# Patient Record
Sex: FEMALE | Race: BLACK OR AFRICAN AMERICAN
[De-identification: names, ages, dates, MRNs, and addresses within clinical notes are randomized per-mention and may not be internally consistent; named-entity substitution may affect disease eponyms.]

---

## 2017-05-05 ENCOUNTER — HOSPITAL ENCOUNTER (EMERGENCY)
Dept: HOSPITAL 17 - NEPE | Age: 72
Discharge: NURSING, REHAB OR LONG-TERM CARE | End: 2017-05-05
Payer: MEDICARE

## 2017-05-05 VITALS
HEART RATE: 95 BPM | DIASTOLIC BLOOD PRESSURE: 98 MMHG | SYSTOLIC BLOOD PRESSURE: 172 MMHG | RESPIRATION RATE: 14 BRPM | OXYGEN SATURATION: 100 %

## 2017-05-05 VITALS
HEART RATE: 107 BPM | DIASTOLIC BLOOD PRESSURE: 95 MMHG | SYSTOLIC BLOOD PRESSURE: 176 MMHG | RESPIRATION RATE: 20 BRPM | OXYGEN SATURATION: 100 %

## 2017-05-05 VITALS
OXYGEN SATURATION: 100 % | HEART RATE: 93 BPM | SYSTOLIC BLOOD PRESSURE: 174 MMHG | RESPIRATION RATE: 15 BRPM | DIASTOLIC BLOOD PRESSURE: 85 MMHG

## 2017-05-05 VITALS
HEART RATE: 112 BPM | SYSTOLIC BLOOD PRESSURE: 191 MMHG | TEMPERATURE: 97.9 F | DIASTOLIC BLOOD PRESSURE: 107 MMHG | RESPIRATION RATE: 20 BRPM | OXYGEN SATURATION: 100 %

## 2017-05-05 DIAGNOSIS — T85.518A: Primary | ICD-10-CM

## 2017-05-05 DIAGNOSIS — Z43.1: ICD-10-CM

## 2017-05-05 DIAGNOSIS — I10: ICD-10-CM

## 2017-05-05 DIAGNOSIS — Z46.59: ICD-10-CM

## 2017-05-05 LAB
ANION GAP SERPL CALC-SCNC: 9 MEQ/L (ref 5–15)
APTT BLD: 24.9 SEC (ref 24.3–30.1)
BASOPHILS # BLD AUTO: 0.1 TH/MM3 (ref 0–0.2)
BASOPHILS NFR BLD: 0.9 % (ref 0–2)
BUN SERPL-MCNC: 16 MG/DL (ref 7–18)
CHLORIDE SERPL-SCNC: 101 MEQ/L (ref 98–107)
EOSINOPHIL # BLD: 0 TH/MM3 (ref 0–0.4)
EOSINOPHIL NFR BLD: 0.5 % (ref 0–4)
ERYTHROCYTE [DISTWIDTH] IN BLOOD BY AUTOMATED COUNT: 17 % (ref 11.6–17.2)
GFR SERPLBLD BASED ON 1.73 SQ M-ARVRAT: 146 ML/MIN (ref 89–?)
HCO3 BLD-SCNC: 30 MEQ/L (ref 21–32)
HCT VFR BLD CALC: 39.2 % (ref 35–46)
HEMO FLAGS: (no result)
INR PPP: 1 RATIO
LYMPHOCYTES # BLD AUTO: 1.4 TH/MM3 (ref 1–4.8)
LYMPHOCYTES NFR BLD AUTO: 22.4 % (ref 9–44)
MCH RBC QN AUTO: 21 PG (ref 27–34)
MCHC RBC AUTO-ENTMCNC: 30.9 % (ref 32–36)
MCV RBC AUTO: 68 FL (ref 80–100)
MONOCYTES NFR BLD: 6.6 % (ref 0–8)
NEUTROPHILS # BLD AUTO: 4.3 TH/MM3 (ref 1.8–7.7)
NEUTROPHILS NFR BLD AUTO: 69.6 % (ref 16–70)
OVALOCYTES BLD QL SMEAR: (no result)
PLAT MORPH BLD: NORMAL
PLATELET # BLD: 287 TH/MM3 (ref 150–450)
PLATELET BLD QL SMEAR: NORMAL
POTASSIUM SERPL-SCNC: 4.1 MEQ/L (ref 3.5–5.1)
PROTHROMBIN TIME: 11.1 SEC (ref 9.8–11.6)
RBC # BLD AUTO: 5.77 MIL/MM3 (ref 4–5.3)
SCAN/DIFF: (no result)
SODIUM SERPL-SCNC: 140 MEQ/L (ref 136–145)
WBC # BLD AUTO: 6.2 TH/MM3 (ref 4–11)

## 2017-05-05 PROCEDURE — 85730 THROMBOPLASTIN TIME PARTIAL: CPT

## 2017-05-05 PROCEDURE — 80048 BASIC METABOLIC PNL TOTAL CA: CPT

## 2017-05-05 PROCEDURE — 96361 HYDRATE IV INFUSION ADD-ON: CPT

## 2017-05-05 PROCEDURE — 85610 PROTHROMBIN TIME: CPT

## 2017-05-05 PROCEDURE — 99283 EMERGENCY DEPT VISIT LOW MDM: CPT

## 2017-05-05 PROCEDURE — 85025 COMPLETE CBC W/AUTO DIFF WBC: CPT

## 2017-05-05 PROCEDURE — 49465 FLUORO EXAM OF G/COLON TUBE: CPT

## 2017-05-05 PROCEDURE — 96374 THER/PROPH/DIAG INJ IV PUSH: CPT

## 2017-05-05 NOTE — RADRPT
EXAM DATE/TIME:  05/05/2017 16:47 

 

[facility] COMPARISON:  

No previous studies available for comparison.

                      

 

INDICATIONS :     

Patient with a clogged gastrostomy tube. 

                      

 

MEDICAL HISTORY :     

Arthritis

GERD

HTN

Dysphagia

Aphsia

UTI

 

SURGICAL HISTORY :     

PEG Tube

Ear Surgery

Hysterectomy

Left sided craniotomy

 

ENCOUNTER:     

Initial

 

ACUITY:     

1 day

 

PAIN SCORE:     

 

 

FLUORO TIME:      

0.2 minutes

 

IMAGE SERIES:      

1

 

CONTRAST:      

5 cc Omnipaque (iohexol) 350

 

 

PROCEDURE :     

1.  Fluoroscopically guided tube injection.

2.  Conscious sedation with continuous EKG and oximetry monitoring.

 

The risks, benefits and alternatives to the procedure were explained and verbal and written consent w
as obtained.  The site was prepped in sterile fashion.  Full sterile technique was used, including ca
p, mask, sterile gloves and gown and a large sterile sheet.  Hand hygiene and 2% chlorhexidine and/or
 betadine/alcohol prep was utilized per protocol for cutaneous antisepsis.

 

With fluoroscopic guidance the previously placed tube was injected demonstrating the tube to be in go
od position and functioning normally.

 

Conscious sedation was performed with the prescribed dosages and duration as above in the presence of
 an independent trained radiology nurse to assist in the monitoring of the patient.  EKG and oximetry
 remained stable throughout the procedure.  The patient tolerated the procedure well and there were n
o complications.  The patient was sent to post anesthesia recovery in stable condition.

 

CONCLUSION:     

Uncomplicated tube injection as above.

 

 

 

 Otoniel Ley MD on May 05, 2017 at 16:38           

Board Certified Radiologist.

 This report was verified electronically.

## 2017-05-05 NOTE — PD
HPI


Chief Complaint:  Medical Device Problem


Time Seen by Provider:  12:14


Travel History


International Travel<30 days:  No


Contact w/Intl Traveler<30days:  No


Traveled to known affect area:  No





History of Present Illness


HPI


The patient is a 72-year-old  [place]ie female who presents emergency 

department via private transport for gastrostomy tube replacement.  The patient 

has a history of intraparenchymal hemorrhage, upon arrival is nonverbal.  

According to the paperwork the patient was unable to have her gastrostomy tube 

flushed and they were unable to flush and/or drawback from the gastrostomy 

tube.  The patient is nonverbal, is unable to provide any insight.  It appears 

the patient has a gastrostomy tube placed, however, there is no visible valve 

for the blowing of the gastrostomy tube.





PFSH


Past Medical History


Arthritis:  Yes


Heart Rhythm Problems:  No


Cancer:  No


Cardiovascular Problems:  Yes


High Cholesterol:  No


Chest Pain:  No


Congestive Heart Failure:  No


Endocrine:  No


Gastrointestinal Disorders:  Yes


GERD:  Yes


Genitourinary:  Yes


Hiatal Hernia:  No


Hypertension:  Yes


Immune Disorder:  No


Kidney Stones:  No


Medical other:  Yes (DYSPHAGIA, APHSIA, BRAIN SURGERY, UTI, LOUIE)


Musculoskeletal:  Yes (CONTRACTED)


Neurologic:  Yes


Psychiatric:  Yes


Reproductive:  No


Respiratory:  No


Renal Failure:  No


Ulcer:  No


Tetanus Vaccination:  Unknown





Past Surgical History


Abdominal Surgery:  Yes (PEG TUBE)


Cardiac Surgery:  No


Ear Surgery:  Yes


Endocrine Surgery:  No


Eye Surgery:  No


Genitourinary Surgery:  No


Gynecologic Surgery:  Yes (HYSTERECTOMY)


Neurologic Surgery:  Yes (LEFT SIDED CRANIOTOMY 11/22/2016)


Oral Surgery:  No


Thoracic Surgery:  No


Other Surgery:  Yes





Social History


Alcohol Use:  No


Tobacco Use:  No (unable to obtain)


Substance Use:  No





Allergies-Medications


(Allergen,Severity, Reaction):  


Coded Allergies:  


     No Known Allergies (Unverified , 5/5/17)


     UNOBTAINABLE (Unverified , 11/22/16)


Reported Meds & Prescriptions





Reported Meds & Active Scripts


Active


Lisinopril 10 Mg Tab 20 Mg PO Q12HR 30 Days


     hold for sbp less 120


Hydrocodone-Acetaminophen 5-325 mg Tab 1 Tab PEG Q4H PRN


Prevacid Solutab ODT (Lansoprazole) 30 Mg Tab 30 Mg G-TUBE DAILY


     Mix with 4 ml water before giving via tube.


Reported


Vesicare (Solifenacin) 5 Mg Tab 5 Mg PO DAILY


Milk of Magnesia Liq (Magnesium Hydroxide) 400 Mg/5 Ml Susp 30 Ml PO Q6H PRN


Donepezil 5 Mg Tab 5 Mg G-TUBE HS


Magnesium Citrate Liq (Magnesium Citrate) 300 Ml Liq 300 Ml PO AS DIRECTED PRN


Multivitamin Women (Multiple Vitamins W/ Minerals) 1 Tab Tab 1 Tab G-TUBE DAILY


Ascorbic Acid 500 Mg Tab 500 Mg G-TUBE DAILY


Amlodipine (Amlodipine Besylate) 5 Mg Tab 5 Mg G-TUBE DAILY








Review of Systems


ROS Limitations:  Clinical Condition


Except as stated in HPI:  all other systems reviewed are Neg





Physical Exam


Narrative


GENERAL: 72-year-old after American female who is nonverbal.


SKIN: Focused skin assessment warm/dry.


HEAD: Atraumatic. Normocephalic. 


EYES: Pupils equal and round.  3 mm bilateral and reactive.  Gaze is mostly to 

the left.


ENT: No nasal bleeding or discharge.  Dry mucous membranes.


NECK: Trachea midline. No JVD. 


CARDIOVASCULAR: Regular, tachycardic with a heart rate of 110.


RESPIRATORY: No accessory muscle use. Clear to auscultation. Breath sounds 

equal bilaterally. 


GASTROINTESTINAL: Abdomen soft, gastrostomy tube in place.  Louie catheter in 

place.


MUSCULOSKELETAL: Contractures of the extremities.  Right upper extremity is in 

a brace.


NEUROLOGICAL: Eyes open, nonverbal, will recheck with her left upper extremity.


PSYCHIATRIC: Unable to assess.





Data


Data


Last Documented VS





Vital Signs








  Date Time  Temp Pulse Resp B/P Pulse Ox O2 Delivery O2 Flow Rate FiO2


 


5/5/17 16:27  107 20 176/95 100   


 


5/5/17 12:18 97.9       








Orders





 Complete Blood Count With Diff (5/5/17 12:27)


Basic Metabolic Panel (Bmp) (5/5/17 12:27)


Act Partial Throm Time (Ptt) (5/5/17 12:27)


Prothrombin Time / Inr (Pt) (5/5/17 12:27)


Sodium Chlorid 0.9% 500 Ml Inj (Ns 500 M (5/5/17 12:30)


Gastrostomy Tube Injection (5/5/17 )


Iohexol 350 Inj (Omnipaque 350 Inj) (5/5/17 16:22)





Labs





 Laboratory Tests








Test 5/5/17





 12:25


 


White Blood Count 6.2 TH/MM3


 


Red Blood Count 5.77 MIL/MM3


 


Hemoglobin 12.1 GM/DL


 


Hematocrit 39.2 %


 


Mean Corpuscular Volume 68.0 FL


 


Mean Corpuscular Hemoglobin 21.0 PG


 


Mean Corpuscular Hemoglobin 30.9 %





Concent 


 


Red Cell Distribution Width 17.0 %


 


Platelet Count 287 TH/MM3


 


Mean Platelet Volume 10.3 FL


 


Neutrophils (%) (Auto) 69.6 %


 


Lymphocytes (%) (Auto) 22.4 %


 


Monocytes (%) (Auto) 6.6 %


 


Eosinophils (%) (Auto) 0.5 %


 


Basophils (%) (Auto) 0.9 %


 


Neutrophils # (Auto) 4.3 TH/MM3


 


Lymphocytes # (Auto) 1.4 TH/MM3


 


Monocytes # (Auto) 0.4 TH/MM3


 


Eosinophils # (Auto) 0.0 TH/MM3


 


Basophils # (Auto) 0.1 TH/MM3


 


CBC Comment AUTO DIFF 


 


Differential Comment AUTO DIFF





 CONFIRMED


 


Platelet Estimate NORMAL 


 


Platelet Morphology Comment NORMAL 


 


Ovalocytes 1+ 


 


Prothrombin Time 11.1 SEC


 


Prothromb Time International 1.0 RATIO





Ratio 


 


Activated Partial 24.9 SEC





Thromboplast Time 


 


Sodium Level 140 MEQ/L


 


Potassium Level 4.1 MEQ/L


 


Chloride Level 101 MEQ/L


 


Carbon Dioxide Level 30.0 MEQ/L


 


Anion Gap 9 MEQ/L


 


Blood Urea Nitrogen 16 MG/DL


 


Creatinine 0.50 MG/DL


 


Estimat Glomerular Filtration 146 ML/MIN





Rate 


 


Random Glucose 112 MG/DL


 


Calcium Level 10.2 MG/DL











[facility]


Medical Decision Making


Medical Screen Exam Complete:  Yes


Emergency Medical Condition:  Yes


Medical Record Reviewed:  Yes


Interpretation(s)





 Laboratory Tests








Test 5/5/17





 12:25


 


White Blood Count 6.2 TH/MM3


 


Red Blood Count 5.77 MIL/MM3


 


Hemoglobin 12.1 GM/DL


 


Hematocrit 39.2 %


 


Mean Corpuscular Volume 68.0 FL


 


Mean Corpuscular Hemoglobin 21.0 PG


 


Mean Corpuscular Hemoglobin 30.9 %





Concent 


 


Red Cell Distribution Width 17.0 %


 


Platelet Count 287 TH/MM3


 


Mean Platelet Volume 10.3 FL


 


Neutrophils (%) (Auto) 69.6 %


 


Lymphocytes (%) (Auto) 22.4 %


 


Monocytes (%) (Auto) 6.6 %


 


Eosinophils (%) (Auto) 0.5 %


 


Basophils (%) (Auto) 0.9 %


 


Neutrophils # (Auto) 4.3 TH/MM3


 


Lymphocytes # (Auto) 1.4 TH/MM3


 


Monocytes # (Auto) 0.4 TH/MM3


 


Eosinophils # (Auto) 0.0 TH/MM3


 


Basophils # (Auto) 0.1 TH/MM3


 


CBC Comment AUTO DIFF 


 


Differential Comment AUTO DIFF





 CONFIRMED


 


Platelet Estimate NORMAL 


 


Platelet Morphology Comment NORMAL 


 


Ovalocytes 1+ 


 


Prothrombin Time 11.1 SEC


 


Prothromb Time International 1.0 RATIO





Ratio 


 


Activated Partial 24.9 SEC





Thromboplast Time 


 


Sodium Level 140 MEQ/L


 


Potassium Level 4.1 MEQ/L


 


Chloride Level 101 MEQ/L


 


Carbon Dioxide Level 30.0 MEQ/L


 


Anion Gap 9 MEQ/L


 


Blood Urea Nitrogen 16 MG/DL


 


Creatinine 0.50 MG/DL


 


Estimat Glomerular Filtration 146 ML/MIN





Rate 


 


Random Glucose 112 MG/DL


 


Calcium Level 10.2 MG/DL








Differential Diagnosis


Differential diagnosis includes gastrostomy tube malfunction, colitis 

gastrostomy tube, GJ tube malfunction, dehydration.


Narrative Course


IV was established, labs are drawn and sent, and the patient was placed on 

cardiac telemetry monitoring and continuous pulse oximetry monitoring.  The 

patient was administered 1 L of IV fluids.  Consult was placed interventional 

radiology for gastrostomy tube replacement.  Labs are unremarkable.  Patient is 

medically clear for IR.  The patient went to interventional radiology, they 

were able to flush and declog the gastrostomy tube, they performed an x-ray 

with contrast which revealed proper placement, the patient is stable for 

discharge home.  The patient's blood pressure was elevated 170/100 with a heart 

rate of 99, she was unable to get her medications this morning secondary to the 

gastrostomy tube malfunction.  Therefore, patient was administered labetalol 20 

mg intravenously.  The patient was discharged back to the nursing home.





Diagnosis





 Primary Impression:  


 Malfunction of gastrostomy tube


Patient Instructions:  General Instructions





***Additional Instructions:


Discharge back to nursing home.  Return if symptoms worsen or progress.


Disposition:  03 DISCHARGE TO SNF (discharge back to nursing home)


Condition:  Stable








Levy Cruz MD May 5, 2017 12:36

## 2017-05-21 ENCOUNTER — HOSPITAL ENCOUNTER (EMERGENCY)
Dept: HOSPITAL 17 - NEPE | Age: 72
LOS: 1 days | Discharge: HOME | End: 2017-05-22
Payer: MEDICARE

## 2017-05-21 VITALS
HEART RATE: 103 BPM | RESPIRATION RATE: 15 BRPM | SYSTOLIC BLOOD PRESSURE: 165 MMHG | OXYGEN SATURATION: 97 % | DIASTOLIC BLOOD PRESSURE: 92 MMHG

## 2017-05-21 VITALS
DIASTOLIC BLOOD PRESSURE: 104 MMHG | SYSTOLIC BLOOD PRESSURE: 192 MMHG | RESPIRATION RATE: 16 BRPM | OXYGEN SATURATION: 98 % | TEMPERATURE: 98.1 F | HEART RATE: 104 BPM

## 2017-05-21 VITALS
HEART RATE: 104 BPM | DIASTOLIC BLOOD PRESSURE: 92 MMHG | OXYGEN SATURATION: 97 % | RESPIRATION RATE: 15 BRPM | SYSTOLIC BLOOD PRESSURE: 186 MMHG

## 2017-05-21 VITALS — HEIGHT: 65 IN | WEIGHT: 132.28 LBS | BODY MASS INDEX: 22.04 KG/M2

## 2017-05-21 DIAGNOSIS — I69.320: ICD-10-CM

## 2017-05-21 DIAGNOSIS — I69.359: ICD-10-CM

## 2017-05-21 DIAGNOSIS — I60.7: Primary | ICD-10-CM

## 2017-05-21 DIAGNOSIS — S00.83XA: ICD-10-CM

## 2017-05-21 DIAGNOSIS — I10: ICD-10-CM

## 2017-05-21 DIAGNOSIS — N28.89: ICD-10-CM

## 2017-05-21 DIAGNOSIS — I69.391: ICD-10-CM

## 2017-05-21 DIAGNOSIS — K21.9: ICD-10-CM

## 2017-05-21 PROCEDURE — 70450 CT HEAD/BRAIN W/O DYE: CPT

## 2017-05-21 PROCEDURE — 70486 CT MAXILLOFACIAL W/O DYE: CPT

## 2017-05-21 PROCEDURE — 72125 CT NECK SPINE W/O DYE: CPT

## 2017-05-21 NOTE — RADRPT
EXAM DATE/TIME:  05/21/2017 19:35 

 

[facility] COMPARISON:     

No previous studies available for comparison.

 

 

INDICATIONS :     

Possible fall. 

                      

 

RADIATION DOSE:     

24.27 CTDIvol (mGy) 

 

 

 

MEDICAL HISTORY :     

Cardiovascular disease. Hypertension. Dementia.Chronic kidney disease.

 

SURGICAL HISTORY :      

Craniotomy. Hysterectomy.

 

ENCOUNTER:      

Initial

 

ACUITY:      

1 day

 

PAIN SCALE:      

0/10

 

LOCATION:        

neck 

 

TECHNIQUE:     

Volumetric scanning of the cervical spine was performed. Multiplanar reconstructions in the sagittal,
 coronal and oblique axial planes were performed.   Using automated exposure control and adjustment o
f the mA and/or kV according to patient size, radiation dose was kept as low as reasonably achievable
 to obtain optimal diagnostic quality images. 

 

FINDINGS:     

There are a few millimeters of anterolisthesis at C4/C5 that appear degenerative. There is a degenera
tive appearing kyphosis centered around C5/C6. No fracture is demonstrated or evidence of acute malal
ignment. Vertebral bodies have normal height.

 

There is multilevel disc space narrowing with uncovertebral and facet osteoarthritis, severe at C5/C6
, moderate at C6/C7 and C7/T1, mild at the other levels. There is a moderate size posterior disc prot
rusion at C3/C4 causing mild spinal stenosis.

 

Juxtavertebral soft tissues are within normal limits.

 

CONCLUSION:     

1. No fracture or acute appearing malalignment of the cervical spine.

2. Degenerative changes as above.

3. Age indeterminate but most likely nonacute posterior disc protrusion at C3/C4.

 

 

 

 Ray Arnold MD on May 21, 2017 at 19:48           

Board Certified Radiologist.

 This report was verified electronically.

## 2017-05-21 NOTE — RADRPT
EXAM DATE/TIME:  05/21/2017 19:35 

 

[facility] COMPARISON:     

No previous studies available for comparison.

 

 

INDICATIONS :     

Possible fall. Hematoma above left eyebrow.

                      

 

RADIATION DOSE:     

21.96 CTDIvol (mGy) 

 

 

MEDICAL HISTORY :     

Dementia. Hypertension. Cardiovascular diseaseChronic kidney disease.

 

SURGICAL HISTORY :      

Craniotomy. Hysterectomy.

 

ENCOUNTER:      

Initial

 

ACUITY:      

1 day

 

PAIN SCORE:      

2/10

 

LOCATION:       

Left facial 

 

TECHNIQUE:     

Volumetric scanning of the facial bones was performed.  Using automated exposure control and adjustme
nt of the mA and/or kV according to patient size, radiation dose was kept as low as reasonably achiev
able to obtain optimal diagnostic quality images. 

 

FINDINGS:     

 

ORBITS:     

The orbital and infraorbital osseous structures are intact.  The retroconal structures have a normal 
configuration.  No radiopaque foreign bodies are seen.

 

NASAL BONE:     

The nasal bone and maxillary spine are intact

 

ZYGOMATIC ARCHES:     

Symmetric without evidence of fracture.

 

SINUSES:     

The maxillary, ethmoid and frontal sinuses are intact.  No air-fluid levels seen.

 

NASAL CAVITY:     

The nasal septum is intact and midline.  The lacrimal ducts are intact.

 

SOFT TISSUES:     

No radiopaque foreign bodies seen.  No soft-tissue swelling is seen.

 

INTRACRANIAL:     

No intracranial air seen.

 

CRIBIFORM PLATE:     

Grossly intact.

 

CONCLUSION:     

Intact face.

 

 

 

 Ray Arnold MD on May 21, 2017 at 19:52           

Board Certified Radiologist.

 This report was verified electronically.

## 2017-05-21 NOTE — RADRPT
EXAM DATE/TIME:  05/21/2017 19:35 

 

[facility] COMPARISON:     

CT BRAIN W/O CONTRAST, November 24, 2016, 4:27.

 

 

INDICATIONS :     

Possible fall. Hematoma above left eyebrow. 

                      

 

RADIATION DOSE:     

56.35 CTDIvol (mGy) 

 

 

 

MEDICAL HISTORY :     

Cardiovascular disease. Hypertension. Dementia.Chronic kidney disease.

 

SURGICAL HISTORY :      

Craniotomy. Hysterectomy.

 

ENCOUNTER:      

Initial

 

ACUITY:      

1 day

 

PAIN SCALE:      

0/10

 

LOCATION:        

cranial 

 

TECHNIQUE:     

Multiple contiguous axial images were obtained of the head.  Using automated exposure control and adj
ustment of the mA and/or kV according to patient size, radiation dose was kept as low as reasonably a
chievable to obtain optimal diagnostic quality images. 

 

FINDINGS:     

6 mm focus of spontaneous density seen of the left high parietal lobe, most likely subarachnoid. No o
ther evidence of intercranial hemorrhage. There is no mass effect or midline shift. No evidence of an
 acute ischemic event.

 

     Patient has had previous left craniotomy. Large area of left parietal, left frontal and left tem
poral encephalomalacia noted. There is chronic appearing thickening of the overlying dura. Craniotomy
 defect has been repaired since the prior CT and the previously seen drain has been removed.

 

     Small left frontal scalp contusion. No acute skull abnormality demonstrated.

 

CONCLUSION:     

There is a potential subcentimeter acute focus of subarachnoid hemorrhage of the left parietal lobe. 
A left frontal scalp hematoma is noted. Other findings are chronic and please see above.

 

 

 

 Ray Arnold MD on May 21, 2017 at 19:43           

Board Certified Radiologist.

 This report was verified electronically.

## 2017-05-21 NOTE — PD
HPI


Chief Complaint:  Medical Clearance


Time Seen by Provider:  19:35


Travel History


International Travel<30 days:  No


Contact w/Intl Traveler<30days:  No


Traveled to known affect area:  No





History of Present Illness


HPI


72-year-old female was sent from [facility] and rehabilitation evaluation 

of a questionable lump over the left eyebrow of unknown origin at the request 

the patient's daughter.  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 

hemiparesis, and difficulty swallowing.  She has an additional history of 

hypertension, seizure disorder, acute renal failure, encephalopathy, GERD, ulcer

, neuromuscular dysfunction bladder, and intracerebral hemorrhage.  Patient's 

baseline is reported to be alert, disoriented, and cannot follow simple 

instructions.  Secondary to patient's history of aphasia secondary to CVA, H&P 

is limited to the documentation sent by [facility] facility.





PFS


Past Medical History


Arthritis:  Yes


Heart Rhythm Problems:  No


Cancer:  No


Cardiovascular Problems:  Yes


High Cholesterol:  No


Chest Pain:  No


Congestive Heart Failure:  No


Endocrine:  No


Gastrointestinal Disorders:  Yes


GERD:  Yes


Genitourinary:  Yes


Hiatal Hernia:  No


Hypertension:  Yes


Immune Disorder:  No


Kidney Stones:  No


Musculoskeletal:  Yes (CONTRACTED)


Neurologic:  Yes


Psychiatric:  Yes


Reproductive:  No


Respiratory:  No


Renal Failure:  No


Ulcer:  No





Past Surgical History


Abdominal Surgery:  Yes (PEG TUBE)


Cardiac Surgery:  No


Ear Surgery:  Yes


Endocrine Surgery:  No


Eye Surgery:  No


Genitourinary Surgery:  No


Gynecologic Surgery:  Yes (HYSTERECTOMY)


Neurologic Surgery:  Yes (LEFT SIDED CRANIOTOMY 11/22/2016, cranioplasty.)


Oral Surgery:  No


Thoracic Surgery:  No


Other Surgery:  Yes





Social History


Alcohol Use:  No


Tobacco Use:  No (unable to obtain)


Substance Use:  No





Allergies-Medications


(Allergen,Severity, Reaction):  


Coded Allergies:  


     No Known Allergies (Unverified , 5/22/17)


Reported Meds & Prescriptions





Reported Meds & Active Scripts


Active


Lisinopril 10 Mg Tab 20 Mg PO Q12HR 30 Days


     hold for sbp less 120


Hydrocodone-Acetaminophen 5-325 mg Tab 1 Tab PEG Q4H PRN


Prevacid Solutab ODT (Lansoprazole) 30 Mg Tab 30 Mg G-TUBE DAILY


     Mix with 4 ml water before giving via tube.


Reported


Vesicare (Solifenacin) 5 Mg Tab 5 Mg PO DAILY


Milk of Magnesia Liq (Magnesium Hydroxide) 400 Mg/5 Ml Susp 30 Ml PO Q6H PRN


Donepezil 5 Mg Tab 5 Mg G-TUBE HS


Magnesium Citrate Liq (Magnesium Citrate) 300 Ml Liq 300 Ml PO AS DIRECTED PRN


Multivitamin Women (Multiple Vitamins W/ Minerals) 1 Tab Tab 1 Tab G-TUBE DAILY


Ascorbic Acid 500 Mg Tab 500 Mg G-TUBE DAILY


Amlodipine (Amlodipine Besylate) 5 Mg Tab 5 Mg G-TUBE DAILY








Review of Systems


ROS Limitations:  Speech Impaired


Except as stated in HPI:  all other systems reviewed are Neg





Physical Exam


Narrative


GENERAL: Well-developed, well nourished, in no acute distress, and non-ill 

appearing.


SKIN: Focused skin assessment warm and dry.  No obvious lesion, hematoma, 

fluctuation or induration, or tenderness noted over area of concern.  Possible 

small amount of soft tissue swelling.


HEAD: Atraumatic. Normocephalic. 


EYES: Pupils equal and round. EOMI. No scleral icterus. No injection or 

drainage. 


ENT: No nasal bleeding or discharge.  Mucous membranes pink and moist.


NECK: Trachea midline. Supple.  No nuclear rigidity.


CARDIOVASCULAR: Regular rate and rhythm.  No murmur appreciated.


RESPIRATORY: No accessory muscle use.  No respiratory distress. Clear to 

auscultation. Breath sounds equal bilaterally. 


GASTROINTESTINAL: Abdomen soft, non-tender, nondistended. Hepatic and splenic 

margins not palpable. No pulsatile mass.


MUSCULOSKELETAL: No obvious deformities. No clubbing.  No cyanosis.  No edema.  

Full range of motion.


NEUROLOGICAL: Awake and alert.





Data


Data


Last Documented VS





Orders





 Ct Brain W/O Iv Contrast(Rout) (5/21/17 )


Ct Cerv Spine W/O Contrast (5/21/17 )


Ct Facial Bones W/O Iv Cont (5/21/17 )


Iv Access Insert/Monitor (5/21/17 20:05)


Ecg Monitoring (5/21/17 20:05)


Oximetry (5/21/17 20:05)


Sodium Chloride 0.9% Flush (Ns Flush) (5/21/17 20:15)


Nicardipine Inj (Cardene Inj) (5/21/17 20:15)


Lisinopril (Prinivil) (5/21/17 20:30)








MDM


Medical Decision Making


Medical Screen Exam Complete:  Yes


Emergency Medical Condition:  Yes


Interpretation(s)


CT of cervical spine reviewed with radiologist shows: 





1. No fracture or acute appearing malalignment of the cervical spine.


2. Degenerative changes as above.


3. Age indeterminate but most likely nonacute posterior disc protrusion at C3/

C4.





CT of facial bones reviewed with radiologist shows: Intact face.





CT of head reviewed with radiologist shows: There is a potential of the 

subcentimeter acute focus of subarachnoid hemorrhage left parietal lobe. A left 

frontal scalp hematoma is noted. Other findings are chronic


Differential Diagnosis


Fracture, contusion, strain, hematoma, cranial hemorrhage, head injury, other


Narrative Course


2015 discussed all findings with patient's daughter's  who is now at the 

bedside.  States that the swelling has decreased significantly.  States she is 

not certain what happened.  States that she had her mom set up she eats went 

out to her car and when she came back noticed a bump on the head.  Reports a 

subsided significantly from initial onset.  Reports the patient has a follow-up 

appointment with Dr. Park tomorrow.





Patient in no obvious distress upon re-evaluation. All pertinent Radiology 

result(s) discussed with patient/family.  Discussed patient with Dr. Lopez 

prior to discharge, who is in agreement with plan of care and disposition..  

Any questions/concerns in reference to patient diagnosis/condition discussed 

and clarified prior to patient's discharge. Reinforced sheer importance of 

close follow up with patient's primary physician or primary care clinic and 

neurosurgeon. Instructed patient to return to ED immediately, if symptoms return

/worsen. Pt's daughter showed understanding of above instructions.  Further 

instructions and recommendations were detailed in discharge paperwork.  Pt left 

without difficulty out of ED at discharge.





Physician Communication


Physician Communication


2010 discussed CT findings with Dr. Hall, neurosurgeon on call, states is 

really nothing to do for this the patient can be followed up in 1-2 days by his 

office for repeat CT as an outpatient.





Diagnosis





 Primary Impression:  


 Subarachnoid hemorrhage


 Additional Impression:  


 Hematoma


Patient Instructions:  General Instructions, Hematoma (ED), Subarachnoid 

Hemorrhage (DC)





***Additional Instructions:


Follow-up with Dr. Park tomorrow as scheduled.  Return to the emergency 

department if symptoms get worse.


Disposition:  01 DISCHARGE HOME


Condition:  Stable








Cedrick Claire May 21, 2017 19:44

## 2017-05-22 VITALS
SYSTOLIC BLOOD PRESSURE: 144 MMHG | DIASTOLIC BLOOD PRESSURE: 84 MMHG | OXYGEN SATURATION: 98 % | RESPIRATION RATE: 16 BRPM | HEART RATE: 98 BPM

## 2017-05-22 VITALS
HEART RATE: 82 BPM | DIASTOLIC BLOOD PRESSURE: 80 MMHG | TEMPERATURE: 99.3 F | OXYGEN SATURATION: 98 % | RESPIRATION RATE: 20 BRPM | SYSTOLIC BLOOD PRESSURE: 142 MMHG

## 2017-05-22 VITALS — DIASTOLIC BLOOD PRESSURE: 78 MMHG | SYSTOLIC BLOOD PRESSURE: 142 MMHG

## 2017-08-24 ENCOUNTER — HOSPITAL ENCOUNTER (INPATIENT)
Dept: HOSPITAL 17 - NEPE | Age: 72
LOS: 2 days | Discharge: HOSPICE | DRG: 64 | End: 2017-08-26
Attending: FAMILY MEDICINE | Admitting: FAMILY MEDICINE
Payer: MEDICARE

## 2017-08-24 VITALS
RESPIRATION RATE: 14 BRPM | TEMPERATURE: 98.9 F | HEART RATE: 99 BPM | SYSTOLIC BLOOD PRESSURE: 163 MMHG | OXYGEN SATURATION: 98 % | DIASTOLIC BLOOD PRESSURE: 85 MMHG

## 2017-08-24 VITALS
SYSTOLIC BLOOD PRESSURE: 227 MMHG | RESPIRATION RATE: 16 BRPM | DIASTOLIC BLOOD PRESSURE: 108 MMHG | OXYGEN SATURATION: 98 % | HEART RATE: 133 BPM

## 2017-08-24 VITALS
RESPIRATION RATE: 15 BRPM | OXYGEN SATURATION: 100 % | SYSTOLIC BLOOD PRESSURE: 199 MMHG | HEART RATE: 124 BPM | DIASTOLIC BLOOD PRESSURE: 95 MMHG

## 2017-08-24 VITALS
DIASTOLIC BLOOD PRESSURE: 69 MMHG | HEART RATE: 93 BPM | SYSTOLIC BLOOD PRESSURE: 148 MMHG | OXYGEN SATURATION: 100 % | RESPIRATION RATE: 16 BRPM

## 2017-08-24 VITALS
OXYGEN SATURATION: 99 % | SYSTOLIC BLOOD PRESSURE: 156 MMHG | HEART RATE: 87 BPM | DIASTOLIC BLOOD PRESSURE: 80 MMHG | RESPIRATION RATE: 14 BRPM | TEMPERATURE: 97.8 F

## 2017-08-24 VITALS
OXYGEN SATURATION: 100 % | RESPIRATION RATE: 16 BRPM | SYSTOLIC BLOOD PRESSURE: 164 MMHG | HEART RATE: 112 BPM | DIASTOLIC BLOOD PRESSURE: 89 MMHG

## 2017-08-24 VITALS — WEIGHT: 129.63 LBS | BODY MASS INDEX: 19.2 KG/M2 | HEIGHT: 69 IN

## 2017-08-24 VITALS — HEART RATE: 80 BPM

## 2017-08-24 VITALS — OXYGEN SATURATION: 99 %

## 2017-08-24 VITALS — HEART RATE: 78 BPM

## 2017-08-24 VITALS — SYSTOLIC BLOOD PRESSURE: 146 MMHG | DIASTOLIC BLOOD PRESSURE: 78 MMHG

## 2017-08-24 VITALS — HEART RATE: 118 BPM

## 2017-08-24 DIAGNOSIS — K21.9: ICD-10-CM

## 2017-08-24 DIAGNOSIS — N39.0: ICD-10-CM

## 2017-08-24 DIAGNOSIS — G93.40: ICD-10-CM

## 2017-08-24 DIAGNOSIS — Z51.5: ICD-10-CM

## 2017-08-24 DIAGNOSIS — Z90.710: ICD-10-CM

## 2017-08-24 DIAGNOSIS — M19.90: ICD-10-CM

## 2017-08-24 DIAGNOSIS — Z93.1: ICD-10-CM

## 2017-08-24 DIAGNOSIS — I69.320: ICD-10-CM

## 2017-08-24 DIAGNOSIS — Z74.01: ICD-10-CM

## 2017-08-24 DIAGNOSIS — M21.371: ICD-10-CM

## 2017-08-24 DIAGNOSIS — I61.9: Primary | ICD-10-CM

## 2017-08-24 DIAGNOSIS — F03.90: ICD-10-CM

## 2017-08-24 DIAGNOSIS — G40.409: ICD-10-CM

## 2017-08-24 DIAGNOSIS — M21.372: ICD-10-CM

## 2017-08-24 DIAGNOSIS — R29.810: ICD-10-CM

## 2017-08-24 DIAGNOSIS — Z66: ICD-10-CM

## 2017-08-24 DIAGNOSIS — Z79.899: ICD-10-CM

## 2017-08-24 DIAGNOSIS — I69.391: ICD-10-CM

## 2017-08-24 DIAGNOSIS — I10: ICD-10-CM

## 2017-08-24 LAB
ALP SERPL-CCNC: 118 U/L (ref 45–117)
ALT SERPL-CCNC: 20 U/L (ref 10–53)
ANION GAP SERPL CALC-SCNC: 7 MEQ/L (ref 5–15)
AST SERPL-CCNC: 17 U/L (ref 15–37)
BACTERIA #/AREA URNS HPF: (no result) /HPF
BASOPHILS # BLD AUTO: 0.1 TH/MM3 (ref 0–0.2)
BASOPHILS NFR BLD: 1.2 % (ref 0–2)
BILIRUB SERPL-MCNC: 0.4 MG/DL (ref 0.2–1)
BUN SERPL-MCNC: 15 MG/DL (ref 7–18)
CHLORIDE SERPL-SCNC: 104 MEQ/L (ref 98–107)
COLOR UR: YELLOW
COMMENT (UR): (no result)
CULTURE IF INDICATED: (no result)
EOSINOPHIL # BLD: 0.1 TH/MM3 (ref 0–0.4)
EOSINOPHIL NFR BLD: 1.1 % (ref 0–4)
ERYTHROCYTE [DISTWIDTH] IN BLOOD BY AUTOMATED COUNT: 17.9 % (ref 11.6–17.2)
GFR SERPLBLD BASED ON 1.73 SQ M-ARVRAT: 140 ML/MIN (ref 89–?)
GLUCOSE UR STRIP-MCNC: (no result) MG/DL
HCO3 BLD-SCNC: 29.3 MEQ/L (ref 21–32)
HCT VFR BLD CALC: 38.4 % (ref 35–46)
HEMO FLAGS: (no result)
HGB UR QL STRIP: (no result)
KETONES UR STRIP-MCNC: (no result) MG/DL
LYMPHOCYTES # BLD AUTO: 2.1 TH/MM3 (ref 1–4.8)
LYMPHOCYTES NFR BLD AUTO: 33.7 % (ref 9–44)
MCH RBC QN AUTO: 21.4 PG (ref 27–34)
MCHC RBC AUTO-ENTMCNC: 30.8 % (ref 32–36)
MCV RBC AUTO: 69.5 FL (ref 80–100)
MONOCYTES NFR BLD: 7.7 % (ref 0–8)
NEUTROPHILS # BLD AUTO: 3.6 TH/MM3 (ref 1.8–7.7)
NEUTROPHILS NFR BLD AUTO: 56.3 % (ref 16–70)
NITRITE UR QL STRIP: (no result)
PLATELET # BLD: 274 TH/MM3 (ref 150–450)
POTASSIUM SERPL-SCNC: 3.8 MEQ/L (ref 3.5–5.1)
RBC # BLD AUTO: 5.52 MIL/MM3 (ref 4–5.3)
SODIUM SERPL-SCNC: 140 MEQ/L (ref 136–145)
SP GR UR STRIP: 1.01 (ref 1–1.03)
SQUAMOUS #/AREA URNS HPF: 3 /HPF (ref 0–5)
WBC # BLD AUTO: 6.3 TH/MM3 (ref 4–11)

## 2017-08-24 PROCEDURE — 87186 SC STD MICRODIL/AGAR DIL: CPT

## 2017-08-24 PROCEDURE — 96372 THER/PROPH/DIAG INJ SC/IM: CPT

## 2017-08-24 PROCEDURE — 85610 PROTHROMBIN TIME: CPT

## 2017-08-24 PROCEDURE — 80053 COMPREHEN METABOLIC PANEL: CPT

## 2017-08-24 PROCEDURE — 70450 CT HEAD/BRAIN W/O DYE: CPT

## 2017-08-24 PROCEDURE — 87641 MR-STAPH DNA AMP PROBE: CPT

## 2017-08-24 PROCEDURE — 81001 URINALYSIS AUTO W/SCOPE: CPT

## 2017-08-24 PROCEDURE — 87086 URINE CULTURE/COLONY COUNT: CPT

## 2017-08-24 PROCEDURE — 85025 COMPLETE CBC W/AUTO DIFF WBC: CPT

## 2017-08-24 PROCEDURE — 87077 CULTURE AEROBIC IDENTIFY: CPT

## 2017-08-24 PROCEDURE — 93005 ELECTROCARDIOGRAM TRACING: CPT

## 2017-08-24 PROCEDURE — 96374 THER/PROPH/DIAG INJ IV PUSH: CPT

## 2017-08-24 RX ADMIN — DONEPEZIL HYDROCHLORIDE SCH MG: 5 TABLET, FILM COATED ORAL at 20:45

## 2017-08-24 RX ADMIN — Medication SCH ML: at 20:46

## 2017-08-24 RX ADMIN — LISINOPRIL SCH MG: 20 TABLET ORAL at 20:46

## 2017-08-24 NOTE — PD.CONS
HPI


Service


Critical Care Medicine


Consult Requested By





Primary Care Physician


Nestor Mccloud DO


History of Present Illness


72 year old female with a history of stroke who is at baseline aphasic, 

bedridden and is fed by a G-tube who lives in a nursing home presents having 

had a CT scan done to follow-up on possible hydrocephalus.  Incidentally it was 

found that she had a small 1 cm lesion in the right parietal lobe which is 

hyperdense concerning for an intracranial hemorrhage.  Per chart documentation 

the patient would not want to be resuscitated if she were to die naturally in 

the hospital.  The family would not want aggressive measures for her and there 

would like her to be kept comfortable.  They were agreeable to a DNR/DNI order.





Review of Systems


ROS


Unobtainable patient is nonverbal





Past Family Social History


Allergies:  


Coded Allergies:  


     No Known Allergies (Unverified , 5/22/17)


Past Medical History


Arthritis


Cerebrovascular Accident


Dementia


GERD


Hypertension


Past Surgical History


PEG tube placement


Hysterectomy


Ear surgery


Reported Medications





Reported Meds & Active Scripts


Active


Lisinopril 10 Mg Tab 20 Mg PO Q12HR 30 Days


     hold for sbp less 120


Hydrocodone-Acetaminophen 5-325 mg Tab 1 Tab PEG Q4H PRN


Reported


Zinc Sulfate 220 Mg Tab 220 Mg PEG DAILY


Zantac (Ranitidine HCl) 150 Mg Tab 150 Mg PEG DAILY


Enema Disposable (Sodium Phosphates) 1 Tasha Tasha 1 Applic RECTAL IN THE AM PRN


Dulcolax Supp (Bisacodyl) 10 Mg Supp 10 Mg RECTAL IN THE AM PRN


Tylenol (Acetaminophen) 325 Mg Tab 650 Mg PEG Q4H PRN


Milk of Magnesia Liq (Magnesium Hydroxide) 400 Mg/5 Ml Susp 30 Ml PEG HS PRN


Donepezil 5 Mg Tab 5 Mg PEG HS


Magnesium Citrate Liq (Magnesium Citrate) 300 Ml Liq 300 Ml PEG IN THE AM PRN


Ascorbic Acid 500 Mg Tab 500 Mg PEG DAILY


Amlodipine (Amlodipine Besylate) 5 Mg Tab 5 Mg PEG DAILY


Active Ordered Medications





Current Medications








 Medications


  (Trade)  Dose


 Ordered  Sig/Taina


 Route


 PRN Reason  Start Time


 Stop Time Status Last Admin


Dose Admin


 


 Ondansetron HCl


  (Zofran Inj)  4 mg  Q6H  PRN


 IV


 NAUSEA OR VOMITING  8/24/17 17:30


     


 


 


 Acetaminophen


  (Tylenol)  650 mg  Q4H  PRN


 PO


 Temp>101F, Headache  8/24/17 17:30


     


 


 


 Acetaminophen


  (Tylenol Supp)  650 mg  Q4H  PRN


 RECTAL


 Temp>101F, Headache  8/24/17 17:30


     


 


 


 Sodium Chloride


  (NS Flush)  2 ml  BID


 IV FLUSH


   8/24/17 21:00


    8/24/17 20:46


 


 


 Sodium Chloride


  (NS Flush)  2 ml  UNSCH  PRN


 IVF


 FLUSH AFTER USING IV ACCESS  8/24/17 17:30


     


 


 


 Amlodipine


 Besylate


  (Norvasc)  5 mg  DAILY


 G-TUBE


   8/25/17 09:00


     


 


 


 Ascorbic Acid


  (Vitamin C)  500 mg  DAILY


 G-TUBE


   8/25/17 09:00


     


 


 


 Donepezil HCl


  (Aricept)  5 mg  HS


 G-TUBE


   8/24/17 21:00


    8/24/17 20:45


 


 


 Acetaminophen/


 Hydrocodone Bitart


  (Norco  5-325 Mg)  1 tab  Q4H  PRN


 PEG


 PAIN SCALE 1 TO 10  8/24/17 17:45


     


 


 


 Lansoprazole


  (Prevacid Odt)  30 mg  DAILY


 G-TUBE


   8/25/17 09:00


     


 


 


 Lisinopril


  (Prinivil)  20 mg  Q12HR


 PO


   8/24/17 21:00


    8/24/17 20:46


 


 


 Magnesium


 Hydroxide


  (Milk Of


 Magnesia Liq)  30 ml  Q6H  PRN


 PO


 CONSTIPATION  8/24/17 17:45


     


 


 


 Tolterodine


 Tartrate


  (Detrol La)  2 mg  DAILY


 PO


   8/24/17 09:00


     


 


 


 Nicardipine HCl


 25 mg/Sodium


 Chloride  260 ml @ 


 52 mls/hr  Q5H PRN


 IV


 Blood pressure management  8/24/17 21:28


     


 








Family History


No family history of early coronary artery disease or malignancy


Social History


Negative for tobacco alcohol or illicit drug abuse





Physical Exam


Vital Signs





Vital Signs








  Date Time  Temp Pulse Resp B/P (MAP) Pulse Ox O2 Delivery O2 Flow Rate FiO2


 


8/24/17 18:01  93 16 148/69 (95) 100 Room Air  


 


8/24/17 17:58        21


 


8/24/17 17:46  112 16 164/89 (114) 100 Room Air  


 


8/24/17 17:13  118      


 


8/24/17 17:11  124 15 199/95 (129) 100 Room Air  


 


8/24/17 16:00  133 16 227/108 (147) 98 Room Air  


 


8/24/17 14:51  95   100   


 


8/24/17 14:48 97.8 87 14 156/80 (105) 99   








Physical Exam


GENERAL: Elderly debilitated woman, nonverbal lethargic


SKIN: Warm and dry.


HEAD: Normocephalic.


EYES: No scleral icterus. No injection or drainage. 


NECK: Supple, trachea midline. No JVD or lymphadenopathy.


CARDIOVASCULAR: Regular rate and rhythm without murmurs, gallops, or rubs. 


RESPIRATORY: Breath sounds equal bilaterally. No accessory muscle use.


GASTROINTESTINAL: Abdomen soft, non-tender, nondistended. 


MUSCULOSKELETAL: No cyanosis, or edema. 


BACK: Nontender without obvious deformity. 


NEURO EXAM:


GCS: M3 V1 E3


Mental Status: The patient is lethargic and nonverbal


Cranial Nerves: Pupils are round, reactive to light


Reflexes: Biceps, patellar, and Achilles are 2/4 bilaterally. No clonus.


Laboratory





Laboratory Tests








Test


  8/24/17


15:00


 


White Blood Count 6.3 


 


Red Blood Count 5.52 


 


Hemoglobin 11.8 


 


Hematocrit 38.4 


 


Mean Corpuscular Volume 69.5 


 


Mean Corpuscular Hemoglobin 21.4 


 


Mean Corpuscular Hemoglobin


Concent 30.8 


 


 


Red Cell Distribution Width 17.9 


 


Platelet Count 274 


 


Mean Platelet Volume 10.1 


 


Neutrophils (%) (Auto) 56.3 


 


Lymphocytes (%) (Auto) 33.7 


 


Monocytes (%) (Auto) 7.7 


 


Eosinophils (%) (Auto) 1.1 


 


Basophils (%) (Auto) 1.2 


 


Neutrophils # (Auto) 3.6 


 


Lymphocytes # (Auto) 2.1 


 


Monocytes # (Auto) 0.5 


 


Eosinophils # (Auto) 0.1 


 


Basophils # (Auto) 0.1 


 


CBC Comment DIFF FINAL 


 


Differential Comment  


 


Urine Color YELLOW 


 


Urine Turbidity CLOUDY 


 


Urine pH 7.5 


 


Urine Specific Gravity 1.010 


 


Urine Protein TRACE 


 


Urine Glucose (UA) NEG 


 


Urine Ketones NEG 


 


Urine Occult Blood TRACE 


 


Urine Nitrite POS 


 


Urine Bilirubin NEG 


 


Urine Urobilinogen LESS THAN 2.0 


 


Urine Leukocyte Esterase LARGE 


 


Urine RBC 6 


 


Urine WBC 91 


 


Urine Squamous Epithelial


Cells 3 


 


 


Urine Amorphous Sediment RARE 


 


Urine Bacteria MANY 


 


Microscopic Urinalysis Comment


  CULTURE


INDICATED


 


Blood Urea Nitrogen 15 


 


Creatinine 0.52 


 


Random Glucose 95 


 


Total Protein 8.3 


 


Albumin 3.4 


 


Calcium Level 10.2 


 


Alkaline Phosphatase 118 


 


Aspartate Amino Transf


(AST/SGOT) 17 


 


 


Alanine Aminotransferase


(ALT/SGPT) 20 


 


 


Total Bilirubin 0.4 


 


Sodium Level 140 


 


Potassium Level 3.8 


 


Chloride Level 104 


 


Carbon Dioxide Level 29.3 


 


Anion Gap 7 


 


Estimat Glomerular Filtration


Rate 140 


 














 Date/Time


Source Procedure


Growth Status


 


 


 8/24/17 15:00


Urine Clean Catch Urine Culture


Pending Received








Result Diagram:  


8/24/17 1500                                                                   

             8/24/17 1500








Assessment and Plan


Assessment and Plan


New CNS lesion 


- neuro checks in a serial fashion. 


- nonoperative supportive care


- Keppra for prophylaxis of seizures


- Management per neurosurgery





Hypertension


- Cardene drip


- SBP goal less than 150





History of Cerebrovascular Accident


- PT and OT evaluation





Dementia


- Supportive care





GERD


- Protonix





DVT GI prophylaxis


- Teds SCDs


- No pharmacological DVT prophylaxis due to suspected ICH


- Protonix





Critical Care:


The total critical care time was 35 minutes. Time to perform other separately 

billable procedures was not included in the critical care time.











Anthony Garcia MD Aug 24, 2017 18:25

## 2017-08-24 NOTE — PD.CONS
__________________________________________________





HPI


Service


neurosurgery


Consult Requested By


Dr Epps


Reason for Consult


Brain lesion


Primary Care Physician


Nestor Mccloud, DO


History of Present Illness


This is a 72 year old female who was brought to [place] emergency department 

with altered mental status. She has history of prior ICH and a prior craniotomy 

in 2016. She had a hemorrhagic stroke and she is at baseline aphasic. In 

addition nshe is bedridden and is fed by a G-tube. She is a resident  [facility], followed by Dr. Winston. 





She was taken  to the emergency department having had a CT scan done for 

increased generalized weakness and lethargy. She was seen at [facility] ER and a CT 

scan showed a 1 cm lesion in the right parietal lobe which is hyperdense 

concerning for an intracranial hemorrhage. Upon presentation to [facility] ER she 

was hemodynamically stable. She had a generalized tonic clonic seizure. No 

tongue bitting. No incontinence of stool. No incontinence of urine. she was 

given anticonvulsants





 ROS - General 


Review of Systems


Constitutional:  COMPLAINS OF: Fever, Dizziness


Patient unable to give ROS





 PFSH 


Past Family Social History


Allergies:  


Coded Allergies:  


     No Known Allergies (Unverified , 5/22/17)


Past Medical History


Arthritis


CVA


Dementia


GERD


HTN


Past Surgical History


Peg tube placement


Hysterectomy


ear surgery


Active Ordered Medications





Current Medications








 Medications


  (Trade)  Dose


 Ordered  Sig/Taina


 Route  Start Time


 Stop Time Status Last Admin


 


  (Zofran Inj)  4 mg  Q6H  PRN


 IV  8/24/17 17:30


     


 


 


  (Tylenol)  650 mg  Q4H  PRN


 PO  8/24/17 17:30


     


 


 


  (Tylenol Supp)  650 mg  Q4H  PRN


 RECTAL  8/24/17 17:30


     


 


 


  (NS Flush)  2 ml  BID


 IV FLUSH  8/24/17 21:00


    8/25/17 09:00


 


 


  (NS Flush)  2 ml  UNSCH  PRN


 IVF  8/24/17 17:30


     


 


 


  (Norvasc)  5 mg  DAILY


 G-TUBE  8/25/17 09:00


    8/25/17 08:59


 


 


  (Vitamin C)  500 mg  DAILY


 G-TUBE  8/25/17 09:00


    8/25/17 09:00


 


 


  (Aricept)  5 mg  HS


 G-TUBE  8/24/17 21:00


    8/24/17 20:45


 


 


  (Norco  5-325 Mg)  1 tab  Q4H  PRN


 PEG  8/24/17 17:45


     


 


 


  (Prevacid Odt)  30 mg  DAILY


 G-TUBE  8/25/17 09:00


    8/25/17 08:59


 


 


  (Prinivil)  20 mg  Q12HR


 PO  8/24/17 21:00


    8/25/17 09:00


 


 


  (Milk Of


 Magnesia Liq)  30 ml  Q6H  PRN


 PO  8/24/17 17:45


     


 


 


  (Detrol La)  2 mg  DAILY


 PO  8/24/17 09:00


    8/25/17 09:00


 


 


 Nicardipine HCl


 25 mg/Sodium


 Chloride  260 ml @ 


 52 mls/hr  Q5H PRN


 IV  8/24/17 21:28


     


 








Family History


Unable to give history


Social History


Lives at [facility]





 Physical Exam 


Physical Exam


Vital Signs





Vital Signs








  Date Time  Temp Pulse Resp B/P (MAP) Pulse Ox O2 Delivery O2 Flow Rate FiO2


 


8/25/17 14:00  93      


 


8/25/17 12:00  74      


 


8/25/17 10:00  74      


 


8/25/17 08:00  74      


 


8/25/17 07:00     100 Room Air  


 


8/25/17 07:00  76      


 


8/25/17 06:00  73      


 


8/25/17 04:00  78      


 


8/25/17 04:00 98.9 78 15 107/60 (76) 99   


 


8/25/17 02:00  77      


 


8/25/17 00:00 98.7 83 15 129/68 (88) 99   


 


8/25/17 00:00  74      


 


8/24/17 23:00  78      


 


8/24/17 23:00  80  123/72    


 


8/24/17 22:00  80      


 


8/24/17 21:16  89  161/76    


 


8/24/17 20:45  90  177/83    


 


8/24/17 20:00     99 Room Air  


 


8/24/17 20:00 98.9 99 14 163/85 (111) 98   


 


8/24/17 20:00  99      


 


8/24/17 19:20     99   


 


8/24/17 18:53  96 16 146/78 (100) 99   


 


8/24/17 18:01  93 16 148/69 (95) 100 Room Air  


 


8/24/17 17:58        21


 


8/24/17 17:46  112 16 164/89 (114) 100 Room Air  


 


8/24/17 17:13  118      


 


8/24/17 17:11  124 15 199/95 (129) 100 Room Air  


 


8/24/17 16:00  133 16 227/108 (147) 98 Room Air  


 


8/24/17 14:51  95   100   


 


8/24/17 14:48 97.8 87 14 156/80 (105) 99   








Physical Exam


CONSTITUTIONAL/GENERAL: This is an frail, elderly female in bed in no acute 

distress. 


TUBES/LINES/DRAINS: PIV's, Freed catheter.


SKIN: No jaundice, rashes, or lesions.  No wounds seen anteriorly. Skin 

temperature appropriate. Not diaphoretic. Per DTR excoriation on buttocks


HEAD: Atraumatic. Normocephalic.  


EYES:  No scleral icterus. No injection or drainage.  


ENT: Unable to evaluate hearing.  Nose without bleeding or purulent drainage.  

Moist oral mucosa.


NECK: Trachea midline. Supple. 


CARDIOVASCULAR: Regular rate and rhythm without murmurs, gallops, or rubs. No 

JVD. Peripheral pulses symmetric.


RESPIRATORY/CHEST: Symmetric, unlabored respirations. Clear, diminished to 

auscultation. Breath sounds equal bilaterally. No wheezes, rales, or rhonchi.  


GASTROINTESTINAL: Abdomen soft, non-tender, nondistended.  Bowel sounds 

present.  PEG tube in place.


GENITOURINARY: Without palpable bladder distension. Freed catheter in place.


MUSCULOSKELETAL: Muscular atrophy in all 4 extremities.  Contracted 

extremities.  Bilateral foot drop.  No mottling or clubbing.


NEUROLOGICAL: Awake and alert.  Eyes open, not tracking.  Not following 

commands.  Nonverbal.  Left-sided facial droop noted.


Laboratory





Past Family Social History


Allergies:  


Coded Allergies:  


     No Known Allergies (Unverified , 5/22/17)





Physical Exam


Vital Signs





Vital Signs








  Date Time  Temp Pulse Resp B/P (MAP) Pulse Ox O2 Delivery O2 Flow Rate FiO2


 


8/24/17 14:51  95   100   


 


8/24/17 14:48 97.8 87 14 156/80 (105) 99   








Laboratory





Laboratory Tests








Test


  8/24/17


15:00


 


White Blood Count 6.3 


 


Red Blood Count 5.52 


 


Hemoglobin 11.8 


 


Hematocrit 38.4 


 


Mean Corpuscular Volume 69.5 


 


Mean Corpuscular Hemoglobin 21.4 


 


Mean Corpuscular Hemoglobin


Concent 30.8 


 


 


Red Cell Distribution Width 17.9 


 


Platelet Count 274 


 


Mean Platelet Volume 10.1 


 


Neutrophils (%) (Auto) 56.3 


 


Lymphocytes (%) (Auto) 33.7 


 


Monocytes (%) (Auto) 7.7 


 


Eosinophils (%) (Auto) 1.1 


 


Basophils (%) (Auto) 1.2 


 


Neutrophils # (Auto) 3.6 


 


Lymphocytes # (Auto) 2.1 


 


Monocytes # (Auto) 0.5 


 


Eosinophils # (Auto) 0.1 


 


Basophils # (Auto) 0.1 


 


CBC Comment DIFF FINAL 


 


Differential Comment  








Result Diagram:  


8/24/17 1500








Attending Statement


Neuro.  neuro checks in a serial fashion. I recommend nonoperative supportive 

care.  I have discussed the case with the emergency room physician





Antihypertensives to keep her blood pressure controlled





Keppra for prophylaxis of seizures. 





Pulmonary. aggressive pulmonary toilette, nasotracheal suction, and breathing 

treatments with nebulizers.





PT and OT evaluation





Nutrition. Oral diet





Renal. monitor closely urine output, BUN and creatinine





Endocrine. Monitor serial Acu checks and SSI as needed in detail





ID monitor for signs of infection





Protonix for stress ulcer prophylaxis





Wilber steven and SCD's for DVT prophylaxis











Marc Park MD Aug 24, 2017 15:43

## 2017-08-24 NOTE — PD
HPI


Chief Complaint:  Abnormal Results


Time Seen by Provider:  14:33


Travel History


International Travel<30 days:  No


Contact w/Intl Traveler<30days:  No





History of Present Illness


HPI


This is a 72 year old female who presents to the emergency department who has a 

history of stroke who is at baseline aphasic, bedridden and is fed by a G-tube 

who lives in a nursing home who presents to the emergency department having had 

a CT scan done to follow-up on possible hydrocephalus when it was found that 

she had a small 1 cm lesion in the right parietal lobe which is hyperdense 

concerning for an intracranial hemorrhage.  Her daughter reports that she 

thinks she's been more tired than normal.





Her daughter from [place] as her dedicated power of .  I spoke to 

both that daughter and the daughter who is at her bedside.  They both concurred 

that the patient would not want to be resuscitated if she were to die naturally 

in the hospital.  They would not want aggressive measures for her and there 

would like her to be kept comfortable.  They were agreeable to a DNR/DNI order.





PFSH


Past Medical History


Arthritis:  Yes


Heart Rhythm Problems:  No


Cancer:  No


Cardiovascular Problems:  Yes


High Cholesterol:  No


Chest Pain:  No


Congestive Heart Failure:  No


Cerebrovascular Accident:  Yes


Dementia:  Yes


Endocrine:  No


Gastrointestinal Disorders:  Yes


GERD:  Yes


Genitourinary:  Yes


Hiatal Hernia:  No


Hypertension:  Yes


Immune Disorder:  No


Kidney Stones:  No


Musculoskeletal:  Yes (CONTRACTED)


Neurologic:  Yes


Psychiatric:  Yes


Reproductive:  No


Respiratory:  No


Renal Failure:  No


Ulcer:  No


Tetanus Vaccination:  Unknown


Influenza Vaccination:  No


Pregnant?:  Not Pregnant





Past Surgical History


Abdominal Surgery:  Yes (PEG TUBE)


Cardiac Surgery:  No


Ear Surgery:  Yes


Endocrine Surgery:  No


Eye Surgery:  No


Genitourinary Surgery:  No


Gynecologic Surgery:  Yes (HYSTERECTOMY)


Hysterectomy:  Yes


Neurologic Surgery:  Yes


Oral Surgery:  No


Thoracic Surgery:  No


Other Surgery:  Yes





Social History


Alcohol Use:  No


Tobacco Use:  No


Substance Use:  No





Allergies-Medications


(Allergen,Severity, Reaction):  


Coded Allergies:  


     No Known Allergies (Unverified , 5/22/17)


Reported Meds & Prescriptions





Reported Meds & Active Scripts


Active


Lisinopril 10 Mg Tab 20 Mg PO Q12HR 30 Days


     hold for sbp less 120


Hydrocodone-Acetaminophen 5-325 mg Tab 1 Tab PEG Q4H PRN


Reported


Zinc Sulfate 220 Mg Tab 220 Mg PEG DAILY


Zantac (Ranitidine HCl) 150 Mg Tab 150 Mg PEG DAILY


Enema Disposable (Sodium Phosphates) 1 Tasha Tasha 1 Applic RECTAL IN THE AM PRN


Dulcolax Supp (Bisacodyl) 10 Mg Supp 10 Mg RECTAL IN THE AM PRN


Tylenol (Acetaminophen) 325 Mg Tab 650 Mg PEG Q4H PRN


Milk of Magnesia Liq (Magnesium Hydroxide) 400 Mg/5 Ml Susp 30 Ml PEG HS PRN


Donepezil 5 Mg Tab 5 Mg PEG HS


Magnesium Citrate Liq (Magnesium Citrate) 300 Ml Liq 300 Ml PEG IN THE AM PRN


Ascorbic Acid 500 Mg Tab 500 Mg PEG DAILY


Amlodipine (Amlodipine Besylate) 5 Mg Tab 5 Mg PEG DAILY








Review of Systems


ROS Limitations:  Unresponsive





Physical Exam


Narrative


GENERAL: Chronically ill-appearing


SKIN: Focused skin assessment warm and dry.


HEAD: Atraumatic. Normocephalic. 


EYES: Pupils equal and round.  No injection or drainage. 


ENT:  Moist mucous membranes


NECK: Trachea midline. 


CARDIOVASCULAR: Regular rate and rhythm.  No murmur appreciated.


RESPIRATORY: Clear to auscultation. Breath sounds equal bilaterally. 


GASTROINTESTINAL: Abdomen soft, non-tender, nondistended. 


NEUROLOGICAL: Some gaze preference to the left, right upper and right lower 

extremities are flaccid, patient opens eyes spontaneously but does not make 

sounds on my exam.





Data


Data


Last Documented VS





Vital Signs








  Date Time  Temp Pulse Resp B/P (MAP) Pulse Ox O2 Delivery O2 Flow Rate FiO2


 


8/24/17 17:13  118      


 


8/24/17 17:11   15  100 Room Air  


 


8/24/17 14:48 97.8       








Orders





 Orders


Complete Blood Count With Diff (8/24/17 14:44)


Comprehensive Metabolic Panel (8/24/17 14:44)


^ Insert Iv (8/24/17 14:44)


Urinary Catheter Insert/Apply (8/24/17 14:44)


Urinalysis - C+S If Indicated (8/24/17 14:44)


Lorazepam Inj (Ativan Inj) (8/24/17 15:30)


Lorazepam Inj (Ativan Inj) (8/24/17 15:28)


Phenytoin Inj (Dilantin Inj) (8/24/17 15:45)


Urine Culture (8/24/17 15:00)


Ceftriaxone Inj (Rocephin Inj) (8/24/17 17:00)


Nicardipine Inj (Cardene Inj) (8/24/17 17:15)


Admit Order (Ed Use Only) (8/24/17 17:13)


Code Status (8/24/17 17:13)





Labs





Laboratory Tests








Test


  8/24/17


15:00


 


White Blood Count 6.3 TH/MM3 


 


Red Blood Count 5.52 MIL/MM3 


 


Hemoglobin 11.8 GM/DL 


 


Hematocrit 38.4 % 


 


Mean Corpuscular Volume 69.5 FL 


 


Mean Corpuscular Hemoglobin 21.4 PG 


 


Mean Corpuscular Hemoglobin


Concent 30.8 % 


 


 


Red Cell Distribution Width 17.9 % 


 


Platelet Count 274 TH/MM3 


 


Mean Platelet Volume 10.1 FL 


 


Neutrophils (%) (Auto) 56.3 % 


 


Lymphocytes (%) (Auto) 33.7 % 


 


Monocytes (%) (Auto) 7.7 % 


 


Eosinophils (%) (Auto) 1.1 % 


 


Basophils (%) (Auto) 1.2 % 


 


Neutrophils # (Auto) 3.6 TH/MM3 


 


Lymphocytes # (Auto) 2.1 TH/MM3 


 


Monocytes # (Auto) 0.5 TH/MM3 


 


Eosinophils # (Auto) 0.1 TH/MM3 


 


Basophils # (Auto) 0.1 TH/MM3 


 


CBC Comment DIFF FINAL 


 


Differential Comment  


 


Urine Color YELLOW 


 


Urine Turbidity CLOUDY 


 


Urine pH 7.5 


 


Urine Specific Gravity 1.010 


 


Urine Protein TRACE mg/dL 


 


Urine Glucose (UA) NEG mg/dL 


 


Urine Ketones NEG mg/dL 


 


Urine Occult Blood TRACE 


 


Urine Nitrite POS 


 


Urine Bilirubin NEG 


 


Urine Urobilinogen


  LESS THAN 2.0


MG/DL


 


Urine Leukocyte Esterase LARGE 


 


Urine RBC 6 /hpf 


 


Urine WBC 91 /hpf 


 


Urine Squamous Epithelial


Cells 3 /hpf 


 


 


Urine Amorphous Sediment RARE 


 


Urine Bacteria MANY /hpf 


 


Microscopic Urinalysis Comment


  CULTURE


INDICATED


 


Blood Urea Nitrogen 15 MG/DL 


 


Creatinine 0.52 MG/DL 


 


Random Glucose 95 MG/DL 


 


Total Protein 8.3 GM/DL 


 


Albumin 3.4 GM/DL 


 


Calcium Level 10.2 MG/DL 


 


Alkaline Phosphatase 118 U/L 


 


Aspartate Amino Transf


(AST/SGOT) 17 U/L 


 


 


Alanine Aminotransferase


(ALT/SGPT) 20 U/L 


 


 


Total Bilirubin 0.4 MG/DL 


 


Sodium Level 140 MEQ/L 


 


Potassium Level 3.8 MEQ/L 


 


Chloride Level 104 MEQ/L 


 


Carbon Dioxide Level 29.3 MEQ/L 


 


Anion Gap 7 MEQ/L 


 


Estimat Glomerular Filtration


Rate 140 ML/MIN 


 











[facility]


Medical Decision Making


Medical Screen Exam Complete:  Yes


Emergency Medical Condition:  Yes


Medical Record Reviewed:  Yes (patient was admitted in May for concern for 

possible subarachnoid hemorrhage in the left parietal lobe.)


Interpretation(s)


No leukocytosis


Electrolytes are reassuring


Urinalysis demonstrates urinary tract infection


Differential Diagnosis


Intracranial hemorrhage, seizure, Urinary tract infection, pyelonephritis


Narrative Course


This is a 72-year-old female who is lying has a very poor functional status who 

presents to the emergency department having had a CT scan concerning for 

possible new intracranial hemorrhage.  Here in the emergency department she had 

a 1 minute tonic-clonic seizure during which she bit her tongue.  She was given 

1 mg of IV Ativan and she was loaded with Dilantin.  Her daughter says she's 

never had seizures before.  Labs are obtained which demonstrate a urinary tract 

infection.  Her Freed catheter was exchanged here in the emergency department.  

I spoke to Dr. Park who came to see the patient at bedside.  I spoke to the 

patient's daughters who are prioritizing comfort care and would not want that a 

surgical intervention or escalation of care to involve intubation or CPR.  A 

DNR order was placed in the chart.  Patient will be admitted to the intensive 

care unit for blood pressure and seizure management and both neurology and 

palliative care will be consulted.  Family would still like to identify the 

etiology of her deterioration of possible.


Critical Care Narrative


Aggregate critical care time was 45 minutes. Time to perform other separately 

billable procedures was not included in the critical care time. My time did not 

include minutes spent treating any other patients simultaneously or on 

activities that did not directly contribute to the patient's treatment.  





The services I provided to this patient were to treat and/or prevent clinically 

significant deterioration that could result in: Disability, death I provided 

critical care services requiring my management, as noted below:


Chart data review, documentation time, medication orders and management, vital 

sign assessments/reviewing monitor data, ordering and reviewing lab tests, 

ordering and interpreting/reviewing x-rays and diagnostic studies, care of the 

patient and discussion of the patient with the admitting physicians.





Physician Communication


Physician Communication


Discussed with Dr. Park





Diagnosis





 Primary Impression:  


 Intraparenchymal hemorrhage of brain





Admitting Information


Admitting Physician Requests:  it











Stefania Epps MD Aug 24, 2017 18:17

## 2017-08-25 VITALS
SYSTOLIC BLOOD PRESSURE: 147 MMHG | RESPIRATION RATE: 38 BRPM | DIASTOLIC BLOOD PRESSURE: 84 MMHG | TEMPERATURE: 98.3 F | OXYGEN SATURATION: 100 % | HEART RATE: 94 BPM

## 2017-08-25 VITALS
HEART RATE: 74 BPM | OXYGEN SATURATION: 99 % | DIASTOLIC BLOOD PRESSURE: 79 MMHG | SYSTOLIC BLOOD PRESSURE: 128 MMHG | RESPIRATION RATE: 14 BRPM | TEMPERATURE: 98.2 F

## 2017-08-25 VITALS
RESPIRATION RATE: 15 BRPM | HEART RATE: 78 BPM | TEMPERATURE: 98.9 F | SYSTOLIC BLOOD PRESSURE: 107 MMHG | OXYGEN SATURATION: 99 % | DIASTOLIC BLOOD PRESSURE: 60 MMHG

## 2017-08-25 VITALS
HEART RATE: 74 BPM | RESPIRATION RATE: 14 BRPM | DIASTOLIC BLOOD PRESSURE: 79 MMHG | OXYGEN SATURATION: 100 % | SYSTOLIC BLOOD PRESSURE: 109 MMHG | TEMPERATURE: 98.3 F

## 2017-08-25 VITALS
HEART RATE: 83 BPM | RESPIRATION RATE: 15 BRPM | TEMPERATURE: 98.7 F | SYSTOLIC BLOOD PRESSURE: 129 MMHG | DIASTOLIC BLOOD PRESSURE: 68 MMHG | OXYGEN SATURATION: 99 %

## 2017-08-25 VITALS — SYSTOLIC BLOOD PRESSURE: 140 MMHG | DIASTOLIC BLOOD PRESSURE: 81 MMHG

## 2017-08-25 VITALS — HEART RATE: 86 BPM

## 2017-08-25 VITALS — HEART RATE: 74 BPM

## 2017-08-25 VITALS — HEART RATE: 93 BPM

## 2017-08-25 VITALS — HEART RATE: 73 BPM

## 2017-08-25 VITALS
TEMPERATURE: 99.2 F | DIASTOLIC BLOOD PRESSURE: 95 MMHG | HEART RATE: 96 BPM | OXYGEN SATURATION: 100 % | SYSTOLIC BLOOD PRESSURE: 169 MMHG | RESPIRATION RATE: 24 BRPM

## 2017-08-25 VITALS — HEART RATE: 77 BPM

## 2017-08-25 VITALS — HEART RATE: 76 BPM

## 2017-08-25 LAB
ALP SERPL-CCNC: 117 U/L (ref 45–117)
ALT SERPL-CCNC: 18 U/L (ref 10–53)
ANION GAP SERPL CALC-SCNC: 7 MEQ/L (ref 5–15)
AST SERPL-CCNC: 16 U/L (ref 15–37)
BASOPHILS # BLD AUTO: 0.1 TH/MM3 (ref 0–0.2)
BASOPHILS NFR BLD: 0.9 % (ref 0–2)
BILIRUB SERPL-MCNC: 0.4 MG/DL (ref 0.2–1)
BUN SERPL-MCNC: 14 MG/DL (ref 7–18)
CHLORIDE SERPL-SCNC: 105 MEQ/L (ref 98–107)
EOSINOPHIL # BLD: 0 TH/MM3 (ref 0–0.4)
EOSINOPHIL NFR BLD: 0.2 % (ref 0–4)
ERYTHROCYTE [DISTWIDTH] IN BLOOD BY AUTOMATED COUNT: 17.6 % (ref 11.6–17.2)
GFR SERPLBLD BASED ON 1.73 SQ M-ARVRAT: 143 ML/MIN (ref 89–?)
HCO3 BLD-SCNC: 29.4 MEQ/L (ref 21–32)
HCT VFR BLD CALC: 38.4 % (ref 35–46)
HEMO FLAGS: (no result)
INR PPP: 1 RATIO
LYMPHOCYTES # BLD AUTO: 1 TH/MM3 (ref 1–4.8)
LYMPHOCYTES NFR BLD AUTO: 15.2 % (ref 9–44)
MCH RBC QN AUTO: 21.8 PG (ref 27–34)
MCHC RBC AUTO-ENTMCNC: 31.2 % (ref 32–36)
MCV RBC AUTO: 69.8 FL (ref 80–100)
MONOCYTES NFR BLD: 9 % (ref 0–8)
NEUTROPHILS # BLD AUTO: 4.7 TH/MM3 (ref 1.8–7.7)
NEUTROPHILS NFR BLD AUTO: 74.7 % (ref 16–70)
PLATELET # BLD: 229 TH/MM3 (ref 150–450)
POTASSIUM SERPL-SCNC: 3.5 MEQ/L (ref 3.5–5.1)
PROTHROMBIN TIME: 11.5 SEC (ref 9.8–11.6)
RBC # BLD AUTO: 5.5 MIL/MM3 (ref 4–5.3)
SODIUM SERPL-SCNC: 141 MEQ/L (ref 136–145)
WBC # BLD AUTO: 6.3 TH/MM3 (ref 4–11)

## 2017-08-25 RX ADMIN — LISINOPRIL SCH MG: 20 TABLET ORAL at 09:00

## 2017-08-25 RX ADMIN — LISINOPRIL SCH MG: 20 TABLET ORAL at 20:37

## 2017-08-25 RX ADMIN — Medication SCH ML: at 09:00

## 2017-08-25 RX ADMIN — ENALAPRILAT PRN MG: 1.25 INJECTION INTRAVENOUS at 15:44

## 2017-08-25 RX ADMIN — OXYCODONE HYDROCHLORIDE AND ACETAMINOPHEN SCH MG: 500 TABLET ORAL at 09:00

## 2017-08-25 RX ADMIN — Medication SCH ML: at 20:37

## 2017-08-25 RX ADMIN — TOLTERODINE TARTRATE SCH MG: 2 CAPSULE, EXTENDED RELEASE ORAL at 09:00

## 2017-08-25 RX ADMIN — HYDROCODONE BITARTRATE AND ACETAMINOPHEN PRN TAB: 5; 325 TABLET ORAL at 17:18

## 2017-08-25 RX ADMIN — DONEPEZIL HYDROCHLORIDE SCH MG: 5 TABLET, FILM COATED ORAL at 20:37

## 2017-08-25 RX ADMIN — LANSOPRAZOLE SCH MG: 30 TABLET, ORALLY DISINTEGRATING, DELAYED RELEASE ORAL at 08:59

## 2017-08-25 NOTE — HHI.HP
History of Present Illness


Service


Medicine


Primary Care Physician


Follow[facility] by Dr. Darling [facility]


Admission Diagnosis





intracranial hemorrhage


Diagnoses:  


History of Present Illness


This is a 72 year old female who presents to the emergency department who has a 

history of stroke who is at baseline aphasic, bedridden and is fed by a G-tube 

who lives at [facility] followed by Dr. Winston who presents to the emergency 

department having had a CT scan done for increased debility and was found that 

she had a small 1 cm lesion in the right parietal lobe which is hyperdense 

concerning for an intracranial hemorrhage.  Discussed with dtr who is at her 

bedside.





Review of Systems


Constitutional:  COMPLAINS OF: Fever, Dizziness


Patient unable to give ROS





Past Family Social History


Allergies:  


Coded Allergies:  


     No Known Allergies (Unverified , 17)


Past Medical History


Arthritis


CVA


Dementia


GERD


HTN


Past Surgical History


Peg tube placement


Hysterectomy


ear surgery


Active Ordered Medications





Current Medications








 Medications


  (Trade)  Dose


 Ordered  Sig/Taina


 Route  Start Time


 Stop Time Status Last Admin


 


  (Zofran Inj)  4 mg  Q6H  PRN


 IV  17 17:30


     


 


 


  (Tylenol)  650 mg  Q4H  PRN


 PO  17 17:30


     


 


 


  (Tylenol Supp)  650 mg  Q4H  PRN


 RECTAL  17 17:30


     


 


 


  (NS Flush)  2 ml  BID


 IV FLUSH  17 21:00


    17 09:00


 


 


  (NS Flush)  2 ml  UNSCH  PRN


 IVF  17 17:30


     


 


 


  (Norvasc)  5 mg  DAILY


 G-TUBE  17 09:00


    17 08:59


 


 


  (Vitamin C)  500 mg  DAILY


 G-TUBE  17 09:00


    17 09:00


 


 


  (Aricept)  5 mg  HS


 G-TUBE  17 21:00


    17 20:45


 


 


  (Norco  5-325 Mg)  1 tab  Q4H  PRN


 PEG  17 17:45


     


 


 


  (Prevacid Odt)  30 mg  DAILY


 G-TUBE  17 09:00


    17 08:59


 


 


  (Prinivil)  20 mg  Q12HR


 PO  17 21:00


    17 09:00


 


 


  (Milk Of


 Magnesia Liq)  30 ml  Q6H  PRN


 PO  17 17:45


     


 


 


  (Detrol La)  2 mg  DAILY


 PO  17 09:00


    17 09:00


 


 


 Nicardipine HCl


 25 mg/Sodium


 Chloride  260 ml @ 


 52 mls/hr  Q5H PRN


 IV  17 21:28


     


 








Family History


Unable to give history


Social History


Lives at [facility]





Physical Exam


Vital Signs





Vital Signs








  Date Time  Temp Pulse Resp B/P (MAP) Pulse Ox O2 Delivery O2 Flow Rate FiO2


 


17 14:00  93      


 


17 12:00  74      


 


17 10:00  74      


 


17 08:00  74      


 


17 07:00     100 Room Air  


 


17 07:00  76      


 


17 06:00  73      


 


17 04:00  78      


 


17 04:00 98.9 78 15 107/60 (76) 99   


 


17 02:00  77      


 


17 00:00 98.7 83 15 129/68 (88) 99   


 


17 00:00  74      


 


17 23:00  78      


 


17 23:00  80  123/72    


 


17 22:00  80      


 


17 21:16  89  161/76    


 


17 20:45  90  177/83    


 


17 20:00     99 Room Air  


 


17 20:00 98.9 99 14 163/85 (111) 98   


 


17 20:00  99      


 


17 19:20     99   


 


17 18:53  96 16 146/78 (100) 99   


 


17 18:01  93 16 148/69 (95) 100 Room Air  


 


17 17:58        21


 


17 17:46  112 16 164/89 (114) 100 Room Air  


 


17 17:13  118      


 


17 17:11  124 15 199/95 (129) 100 Room Air  


 


17 16:00  133 16 227/108 (147) 98 Room Air  


 


17 14:51  95   100   


 


17 14:48 97.8 87 14 156/80 (105) 99   








Physical Exam


CONSTITUTIONAL/GENERAL: This is an frail, elderly female in bed in no acute 

distress. 


TUBES/LINES/DRAINS: PIV's, Freed catheter.


SKIN: No jaundice, rashes, or lesions.  No wounds seen anteriorly. Skin 

temperature appropriate. Not diaphoretic. Per DTR excoriation on buttocks


HEAD: Atraumatic. Normocephalic.  


EYES:  No scleral icterus. No injection or drainage.  


ENT: Unable to evaluate hearing.  Nose without bleeding or purulent drainage.  

Moist oral mucosa.


NECK: Trachea midline. Supple. 


CARDIOVASCULAR: Regular rate and rhythm without murmurs, gallops, or rubs. No 

JVD. Peripheral pulses symmetric.


RESPIRATORY/CHEST: Symmetric, unlabored respirations. Clear, diminished to 

auscultation. Breath sounds equal bilaterally. No wheezes, rales, or rhonchi.  


GASTROINTESTINAL: Abdomen soft, non-tender, nondistended.  Bowel sounds 

present.  PEG tube in place.


GENITOURINARY: Without palpable bladder distension. Freed catheter in place.


MUSCULOSKELETAL: Muscular atrophy in all 4 extremities.  Contracted 

extremities.  Bilateral foot drop.  No mottling or clubbing.


NEUROLOGICAL: Awake and alert.  Eyes open, not tracking.  Not following 

commands.  Nonverbal.  Left-sided facial droop noted.


Laboratory





Laboratory Tests








Test


  17


15:00 17


19:45 17


04:18


 


White Blood Count 6.3   6.3 


 


Red Blood Count 5.52   5.50 


 


Hemoglobin 11.8   12.0 


 


Hematocrit 38.4   38.4 


 


Mean Corpuscular Volume 69.5   69.8 


 


Mean Corpuscular Hemoglobin 21.4   21.8 


 


Mean Corpuscular Hemoglobin


Concent 30.8 


  


  31.2 


 


 


Red Cell Distribution Width 17.9   17.6 


 


Platelet Count 274   229 


 


Mean Platelet Volume 10.1   10.7 


 


Neutrophils (%) (Auto) 56.3   74.7 


 


Lymphocytes (%) (Auto) 33.7   15.2 


 


Monocytes (%) (Auto) 7.7   9.0 


 


Eosinophils (%) (Auto) 1.1   0.2 


 


Basophils (%) (Auto) 1.2   0.9 


 


Neutrophils # (Auto) 3.6   4.7 


 


Lymphocytes # (Auto) 2.1   1.0 


 


Monocytes # (Auto) 0.5   0.6 


 


Eosinophils # (Auto) 0.1   0.0 


 


Basophils # (Auto) 0.1   0.1 


 


CBC Comment DIFF FINAL   DIFF FINAL 


 


Differential Comment     


 


Urine Color YELLOW   


 


Urine Turbidity CLOUDY   


 


Urine pH 7.5   


 


Urine Specific Gravity 1.010   


 


Urine Protein TRACE   


 


Urine Glucose (UA) NEG   


 


Urine Ketones NEG   


 


Urine Occult Blood TRACE   


 


Urine Nitrite POS   


 


Urine Bilirubin NEG   


 


Urine Urobilinogen LESS THAN 2.0   


 


Urine Leukocyte Esterase LARGE   


 


Urine RBC 6   


 


Urine WBC 91   


 


Urine Squamous Epithelial


Cells 3 


  


  


 


 


Urine Amorphous Sediment RARE   


 


Urine Bacteria MANY   


 


Microscopic Urinalysis Comment


  CULTURE


INDICATED 


  


 


 


Blood Urea Nitrogen 15   14 


 


Creatinine 0.52   0.51 


 


Random Glucose 95   103 


 


Total Protein 8.3   7.7 


 


Albumin 3.4   3.1 


 


Calcium Level 10.2   9.9 


 


Alkaline Phosphatase 118   117 


 


Aspartate Amino Transf


(AST/SGOT) 17 


  


  16 


 


 


Alanine Aminotransferase


(ALT/SGPT) 20 


  


  18 


 


 


Total Bilirubin 0.4   0.4 


 


Sodium Level 140   141 


 


Potassium Level 3.8   3.5 


 


Chloride Level 104   105 


 


Carbon Dioxide Level 29.3   29.4 


 


Anion Gap 7   7 


 


Estimat Glomerular Filtration


Rate 140 


  


  143 


 


 


Nasal Screen MRSA (PCR)  MRSA DETECTED  


 


Prothrombin Time   11.5 


 


Prothromb Time International


Ratio 


  


  1.0 


 














 Date/Time


Source Procedure


Growth Status


 


 


 17 15:00


Urine Clean Catch Urine Culture - Preliminary


Gram Negative Husam Resulted








Result Diagram:  


17 0418                                                                   

             17 0418








Caprini VTE Risk Assessment


Caprini VTE Risk Assessment:  Mod/High Risk (score >= 2)


VTE Pharm Contraindication:  Hemorrhage


Caprini Risk Assessment Model











 Point Value = 1          Point Value = 2  Point Value = 3  Point Value = 5


 


Age 41-60


Minor surgery


BMI > 25 kg/m2


Swollen legs


Varicose veins


Pregnancy or postpartum


History of unexplained or recurrent


   spontaneous 


Oral contraceptives or hormone


   replacement


Sepsis (< 1 month)


Serious lung disease, including


   pneumonia (< 1 month)


Abnormal pulmonary function


Acute myocardial infarction


Congestive heart failure (< 1 month)


History of inflammatory bowel disease


Medical patient at bed rest Age 61-74


Arthroscopic surgery


Major open surgery (> 45 min)


Laparoscopic surgery (> 45 min)


Malignancy


Confined to bed (> 72 hours)


Immobilizing plaster cast


Central venous access Age >= 75


History of VTE


Family history of VTE


Factor V Leiden


Prothrombin 26665C


Lupus anticoagulant


Anticardiolipin antibodies


Elevated serum homocysteine


Heparin-induced thrombocytopenia


Other congenital or acquired


   thrombophilia Stroke (< 1 month)


Elective arthroplasty


Hip, pelvis, or leg fracture


Acute spinal cord injury (< 1 month)








Prophylaxis Regimen











   Total Risk


Factor Score Risk Level Prophylaxis Regimen


 


0-1      Low Early ambulation


 


2 Moderate Order ONE of the following:


*Sequential Compression Device (SCD)


*Heparin 5000 units SQ BID


 


3-4 Higher Order ONE of the following medications:


*Heparin 5000 units SQ TID


*Enoxaparin/Lovenox 40 mg SQ daily (WT < 150 kg, CrCl > 30 mL/min)


*Enoxaparin/Lovenox 30 mg SQ daily (WT < 150 kg, CrCl > 10-29 mL/min)


*Enoxaparin/Lovenox 30 mg SQ BID (WT < 150 kg, CrCl > 30 mL/min)


AND/OR


*Sequential Compression Device (SCD)


 


5 or more Highest Order ONE of the following medications:


*Heparin 5000 units SQ TID (Preferred with Epidurals)


*Enoxaparin/Lovenox 40 mg SQ daily (WT < 150 kg, CrCl > 30 mL/min)


*Enoxaparin/Lovenox 30 mg SQ daily (WT < 150 kg, CrCl > 10-29 mL/min)


*Enoxaparin/Lovenox 30 mg SQ BID (WT < 150 kg, CrCl > 30 mL/min)


AND


*Sequential Compression Device (SCD)











Assessment and Plan


Problem List:  


(1) Intraparenchymal hemorrhage of brain


ICD Codes:  I61.9 - Nontraumatic intracerebral hemorrhage, unspecified


Status:  Acute


(2) Dementia


ICD Codes:  F03.90 - Unspecified dementia without behavioral disturbance


(3) Debility


ICD Codes:  R53.81 - Other malaise


(4) HTN (hypertension)


ICD Codes:  I10 - Essential (primary) hypertension


Status:  Acute


(5) New onset seizure


ICD Codes:  R56.9 - Unspecified convulsions


Assessment and Plan


17


Intraparenchymal hemorrhage-  Patient is patient at [facility].  Has been 

none verbal and peg tube for feedings.  Neuro checks and Keppra for seizure 

prevention.  Neurosurgery evaluated in ER nonoperative supportive care. 

Neurology consulted.  





Hypertension-  Continue current therapy B/P well controlled. SBP goal less than 

150 mmhg





GERD- Protonix.





Palliative care consult 





Labs in AM





I and the ARNP have both examined this patient and reviewed this note and I 

agree with these findings and plan of care.  Gerald R Woodard DO Gellermann,Diane M. [facility] Aug 25, 2017 14:49

## 2017-08-25 NOTE — EKG
Date Performed: 08/24/2017       Time Performed: 19:06:50

 

PTAGE:      72 years

 

EKG:      SINUS TACHYCARDIA WITH SHORT NV INTERVAL ABNORMAL RHYTHM ECG Compared to prior tracing no s
ignificant change 

 

 PREVIOUS TRACING            : 11/22/2016 10.43

 

DOCTOR:   Jacob Castillo  Interpretating Date/Time  08/25/2017 11:49:05

## 2017-08-25 NOTE — RADRPT
EXAM DATE/TIME:  08/25/2017 16:52 

 

[facility] COMPARISON:     

CT BRAIN W/O CONTRAST, May 21, 2017, 19:35.

 

 

INDICATIONS :     

Intracranial hemorrhage.

                      

 

RADIATION DOSE:     

56.45 CTDIvol (mGy) 

 

 

 

MEDICAL HISTORY :     

Stroke. Seizures. Hypertension.

 

SURGICAL HISTORY :      

Craniotomy. 

 

ENCOUNTER:      

Initial

 

ACUITY:      

1 day

 

PAIN SCALE:      

Non-responsive

 

LOCATION:        

cranial 

 

TECHNIQUE:     

Multiple contiguous axial images were obtained of the head.  Using automated exposure control and adj
ustment of the mA and/or kV according to patient size, radiation dose was kept as low as reasonably a
chievable to obtain optimal diagnostic quality images.   DICOM format image data is available electro
nically for review and comparison.  

 

FINDINGS:     

There has been prior left frontal craniotomy. Severe relatively diffuse atrophy is present. There is 
a large area of encephalomalacia involving the left cerebrum and there is an associated expected dila
tation of the left lateral ventricle. Ventricles are stable in size. There is severe periventricular 
white matter low attenuation. Nonspecific area of high density is present in the right parietal subco
rtical white matter measuring 14 mm. There is low-density in the left anni and cerebral peduncle, sta
ble from the prior study. There is stable right to left midline shift measuring 5 mm. No downward her
niation is present. Visualized sinuses are clear.

 

CONCLUSION:     

1. Nonspecific 14 mm area of high density in the subcortical white matter of the right parietal lobe.
 Could represent an area of contusion or acute hemorrhage from an uncertain etiology. New finding fro
m the prior study. Suggest followup to confirm resolution.

2. Otherwise, stable chronic changes including severe left cerebral atrophy with a stable dilatation 
of the lateral ventricles, left larger than right. There is also stable severe periventricular white 
matter change representing chronic microvascular ischemia.

 

 

 

 Ray Sharp MD on August 25, 2017 at 17:14           

Board Certified Radiologist.

 This report was verified electronically.

## 2017-08-25 NOTE — MB
cc:

CORTES SWIFT MD

****

 

 

DATE OF CONSULTATION:

08/25/2017

 

REASON FOR CONSULTATION:

'Critical parietal punctate lesion and seizure in the emergency department.'

 

HISTORY OF PRESENT ILLNESS:

Ms. Obregon is a 72-year-old -American female with past medical history 
of intraparenchymal bleed, dementia, hypertension, seizure disorder and renal 
failure.

The patient at baseline is dysphasic status post stroke, at the bedside as her

friend and caregiver who by the medical history was partially obtained from and

the rest was from the medical records. The patient presented from [facility] on 08/24 for an evaluation for an abnormal 
finding on Head CT scan of her parenchymal hematoma measuring 1.5 x 0.8.  The 
patient was noted by the family to be more tired and sleepy.  At baseline she 
opens her eyes and tracks objects but she does not communicate and she has 
spastic weakness on the right side that is

chronic, status post previous stroke.

While she was in the ER at [facility] the patient was witnessed to sustain a 1 minute 
tonic-clonic seizure. This is a new onset episode.She also was found to have a 
positive urine for leukocytes and nitrates,she has chronic indwelling catheter. 

The patient had history of left frontal intraparenchymal hematoma with mass 
affect midline shift s/p tracheotomy with evacuation of hematoma in November 2016, when she was intubated and mechanically ventilated.  A percutaneous 
endoscopic gastrostomy tube was placed in November 2016 and cranioplasty was 
done December 2016. She resides in a long-term facility with rehabilitation.

 

REVIEW OF SYSTEMS

A 12-point review of systems is negative except for what is stated in the HPI. 
This was obtained from medical records.

  

PAST MEDICAL HISTORY

Obtained from medical records arthritis

stroke

dementia.

Gastroesophageal reflux disease

Hypertension

Intracranial hematoma status post evacuation and craniotomy flap

 

PAST SURGICAL HISTORY

Percutaneous endoscopic gastrostomy tube placement

hysterectomy

Craniotomy

 

ALLERGIES

NO KNOWN DRUG ALLERGIES

 

MEDICATIONS

Lisinopril

Hydrocodone

Zantac

Dulcolax

Tylenol

 

FAMILY HISTORY

Unable to obtain.

 

SOCIAL HISTORY

Unable to obtain,negative for tobacco, alcohol or illicit drug as per medical 
records review

 

PHYSICAL EXAMINATION

IN GENERAL:   Elderly debilitated woman nonverbal dysphasic lethargic.

HEAD, EYES, EARS, NOSE,&THROAT: Head tilt to the right, gaze to the right, 
atraumatic.

NECK:  Supple.  No signs of meningeal irritation.

CARDIOVASCULAR SYSTEM: Regular rate and rhythm.

RESPIRATORY: Clear to auscultation.  No wheezes

GASTROINTESTINAL:  Soft abdomen nontender.

NEUROLOGIC: Awake, not alert, not oriented, dysphasic right spastic weakness, 
right facial palsy, mild gaze preference and deviation, spontaneous eye 
opening.  Plantar's right upgoing, left downgoing.  Brisk reflexes on the right 
side normal on the left side. 

 

LABORATORY DATA

white blood cells 6.3, hemoglobin 12, MCV 69.8, platelet count 229, sodium 141, 
potassium 3.5, anion gap 7, calcium 9.9, BUN, creatinine 14.51, LFT within 
normal. Urine, trace of occult blood, nitrite positive, large leukocyte 
esterase 

 

DIAGNOSTICS/IMAGING STUDIES

- Head CT scan was done and [facility] revealed a

1.5 cm hyper attenuation focus with possible surrounding edema within the right

superior parietal lobe findings may be may represent a small parenchymal 
hematoma.

Postsurgical changes of left temporal parietal craniotomy with multifocal 
underlying left MCA territory encephalomalacia mild-to-moderate global 
parenchymal volume loss.

 

DIAGNOSTIC IMPRESSION

1.  New onset seizure.

2.  History of intraparenchymal hemorrhage status post left craniotomy.

3.  Recent onset questionable right parietal hematoma.

4.  History of hypertension.

5.  Dementia.

6.  Dysphagia status post stroke.

 

PLAN

1.  I discussed the clinical and neurological status with the caregiver and 
friend who was at the bedside and she could not touch base with her daughter 
who is a local to fill her in with the current neurological status.

2.  Continue supportive care.

3.  Keppra 500 mg twice daily

4.  Supportive medical care.

5.  Elevation of head of bed.

6.  Hold anticoagulation and antiplatelet.

7.  Maintain blood pressure between 130-135/75-80.

8.  Seizure prophylaxis.

9.  EEG

10. I discussed the case with the registered nurse and the patients friend / 
caregiver.

11. Consult palliative care.

 

 

 

 

 

 

Thank you for the opportunity to participate in the care of the patient.

 

 

 

                              _________________________________

                              MD GENIA Hauser/mukesh

D:  8/25/2017/2:02 PM

T:  8/25/2017/5:51 PM

Visit #:  [id number]

Job #:  [id number]

[place]

## 2017-08-25 NOTE — HHI.NSPN
__________________________________________________





Note Status


Status:  Progress Note





Interval History


Interval History


This is a 72 year old female who was brought to [place] emergency department 

with altered mental status. She has history of prior ICH and a prior craniotomy 

in 2016. She had a hemorrhagic stroke and she is at baseline aphasic. In 

addition nshe is bedridden and is fed by a G-tube. She is a resident  [facility], followed by Dr. Winston. 





She was taken  to the emergency department having had a CT scan done for 

increased generalized weakness and lethargy. She was seen at [facility] ER and a CT 

scan showed a 1 cm lesion in the right parietal lobe which is hyperdense 

concerning for an intracranial hemorrhage. Upon presentation to [facility] ER she 

was hemodynamically stable. She had a generalized tonic clonic seizure. No 

tongue bitting. No incontinence of stool. No incontinence of urine. she was 

given anticonvulsants





8/25. Neurologically stable. No further seizures. Follow up CT brain done today





Labs, Micro, & Vital Signs


Results











  Date Time  Temp Pulse Resp B/P (MAP) Pulse Ox O2 Delivery O2 Flow Rate FiO2


 


8/25/17 14:00  93      


 


8/25/17 12:00  74      


 


8/25/17 10:00  74      


 


8/25/17 08:00  74      


 


8/25/17 07:00     100 Room Air  


 


8/25/17 07:00  76      


 


8/25/17 06:00  73      


 


8/25/17 04:00  78      


 


8/25/17 04:00 98.9 78 15 107/60 (76) 99   


 


8/25/17 02:00  77      


 


8/25/17 00:00 98.7 83 15 129/68 (88) 99   


 


8/25/17 00:00  74      


 


8/24/17 23:00  78      


 


8/24/17 23:00  80  123/72    


 


8/24/17 22:00  80      


 


8/24/17 21:16  89  161/76    


 


8/24/17 20:45  90  177/83    


 


8/24/17 20:00     99 Room Air  


 


8/24/17 20:00 98.9 99 14 163/85 (111) 98   


 


8/24/17 20:00  99      


 


8/24/17 19:20     99   


 


8/24/17 18:53  96 16 146/78 (100) 99   


 


8/24/17 18:01  93 16 148/69 (95) 100 Room Air  


 


8/24/17 17:58        21


 


8/24/17 17:46  112 16 164/89 (114) 100 Room Air  


 


8/24/17 17:13  118      


 


8/24/17 17:11  124 15 199/95 (129) 100 Room Air  


 


8/24/17 16:00  133 16 227/108 (147) 98 Room Air  








Constitutional





Vital Signs








  Date Time  Temp Pulse Resp B/P (MAP) Pulse Ox O2 Delivery O2 Flow Rate FiO2


 


8/25/17 14:00  93      


 


8/25/17 12:00  74      


 


8/25/17 10:00  74      


 


8/25/17 08:00  74      


 


8/25/17 07:00     100 Room Air  


 


8/25/17 07:00  76      


 


8/25/17 06:00  73      


 


8/25/17 04:00  78      


 


8/25/17 04:00 98.9 78 15 107/60 (76) 99   


 


8/25/17 02:00  77      


 


8/25/17 00:00 98.7 83 15 129/68 (88) 99   


 


8/25/17 00:00  74      


 


8/24/17 23:00  78      


 


8/24/17 23:00  80  123/72    


 


8/24/17 22:00  80      


 


8/24/17 21:16  89  161/76    


 


8/24/17 20:45  90  177/83    


 


8/24/17 20:00     99 Room Air  


 


8/24/17 20:00 98.9 99 14 163/85 (111) 98   


 


8/24/17 20:00  99      


 


8/24/17 19:20     99   


 


8/24/17 18:53  96 16 146/78 (100) 99   


 


8/24/17 18:01  93 16 148/69 (95) 100 Room Air  


 


8/24/17 17:58        21


 


8/24/17 17:46  112 16 164/89 (114) 100 Room Air  


 


8/24/17 17:13  118      


 


8/24/17 17:11  124 15 199/95 (129) 100 Room Air  


 


8/24/17 16:00  133 16 227/108 (147) 98 Room Air  











Physical Exam


Neurological examination. She open eyes to voice and spontaneoulsy , aphasic.





Cranial nerve examination: pupils to be equal, round and reactive to light.  

Extra-ocular movements shows mild gaze preference but conjugated. No papiledema


Right facial supranuclear droop and decreased sensory function on the right.  

Gross hearing appears intact.


Sternocleidomastoid and trapezius muscles are symmetrical.  Other cranial 

nerves are intact.





Neck is soft and supple with a good range of motion without pain.





Muscle strength right spastic hemiplegia, moves purposfully left side








Sensory examination is intact to  pin prick in left upper and lower extremities

, decreased on right. Unable to assess light touch





Deep tendon reflexes are 3+ on the right. Right Babinsky, left plantar flexion 

response.





Cerebellar examination can not be assessed due to her neurological condition





Attending Statement


Neuro.  continue Continue neuro checks in a serial fashion.  I reviewed her 

follow up CT brain. Continue nonoperative supportive care.  I have discussed 

the case with her daughter. She requested paliative care consultation





Continue Antihypertensives to keep her blood pressure controlled





Continue Keppra for prophylaxis of seizures. Neurology consultation. EEG





Pulmonary.  Continue  pulmonary toilette, nasotracheal suction, and breathing 

treatments with nebulizers.





Renal.  Continue to monitor closely urine output, BUN and creatinine





Endocrine. continue to Monitor serial Acu checks and SSI as needed 





ID continue to monitor for signs of infection





continue Protonix for stress ulcer prophylaxis





continue Wilber hose and SCD's for DVT prophylaxis











Marc Park MD Aug 25, 2017 14:54

## 2017-08-25 NOTE — HHI.CCPN
Subjective


Remarks/Hospital Course


8/24: 72 year old female with a history of stroke who is at baseline aphasic, 

bedridden and is fed by a G-tube who lives in a nursing home presents having 

had a CT scan done to follow-up on possible hydrocephalus.  Incidentally it was 

found that she had a small 1 cm lesion in the right parietal lobe which is 

hyperdense concerning for an intracranial hemorrhage.  Per chart documentation 

the patient would not want to be resuscitated if she were to die naturally in 

the hospital.  The family would not want aggressive measures for her and there 

would like her to be kept comfortable.  They were agreeable to a DNR/DNI order.





8/25: Resting in bed comfortably.  Encephalopathic, does not appear to be in 

any acute distress.





Objective





Vital Signs








  Date Time  Temp Pulse Resp B/P (MAP) Pulse Ox O2 Delivery O2 Flow Rate FiO2


 


8/25/17 14:00  93      


 


8/25/17 12:00 98.3  14 109/79 (89) 100   


 


8/25/17 09:05        21


 


8/25/17 07:00      Room Air  














Intake and Output   


 


 8/25/17 8/25/17 8/25/17





 07:59 15:59 23:59


 


Intake Total 138 ml  


 


Output Total 500 ml  


 


Balance -362 ml  








Result Diagram:  


8/25/17 0418                                                                   

             8/25/17 0418





Objective Remarks


GENERAL: Elderly debilitated woman, nonverbal lethargic


SKIN: Warm and dry.


HEAD: Normocephalic.


EYES: No scleral icterus. No injection or drainage. 


NECK: Supple, trachea midline. No JVD or lymphadenopathy.


CARDIOVASCULAR: Regular rate and rhythm without murmurs, gallops, or rubs. 


RESPIRATORY: Breath sounds equal bilaterally. No accessory muscle use.


GASTROINTESTINAL: Abdomen soft, non-tender, nondistended. 


MUSCULOSKELETAL: No cyanosis, or edema. 


BACK: Nontender without obvious deformity. 


NEURO EXAM:


GCS: M3 V1 E3


Mental Status: The patient is lethargic and nonverbal


Cranial Nerves: Pupils are round, reactive to light


Reflexes: Biceps, patellar, and Achilles are 2/4 bilaterally. No clonus.





A/P


Assessment and Plan


New CNS lesion 


- neuro checks in a serial fashion. 


- nonoperative supportive care


- Keppra for prophylaxis of seizures


- Management per neurosurgery





Hypertension


- Cardene drip if needed.


- SBP goal less than 150





History of Cerebrovascular Accident


- PT and OT evaluation





Dementia


- Supportive care





GERD


- Protonix





DVT GI prophylaxis


- Teds SCDs


- No pharmacological DVT prophylaxis due to suspected ICH


- Protonix





Palliative care team following.  Patient is DNR/DNI status at this time.  

Family does not want any aggressive measures at this time.  Further 

recommendations per neurosurgery.  Critical care will be available as needed.











Aristeo Fine MD Aug 25, 2017 16:31

## 2017-08-25 NOTE — PD.CONS
Consult


Service


Palliative Care


Consult Requested By


Triston.


Primary Care Physician


Nestor Mccloud, DO


Reason for Consultation


   a.  To assist with evaluation and management of symptoms including: Pain and 

debility.


   b.  To assist medical decision maker(s) with: better understanding of 

current medical conditions; weighing benefits/burdens of medical treatment 

options; making        


        medical treatment decisions.


.





HPI


History of Present Illness





Mrs. Obregon is a 72-year-old female with a past medical history significant 

for brain bleed, dementia, hypertension, seizure disorder and renal failure.  

Patient presented from [facility] on 8/24/17 for 

evaluation of parenchymal hematoma measuring 1.5 cm x 0.8 cm discovered by CT 

scan.  As per patient's family, patient presented more tired and sleepy which 

prompted future evaluation and CT scan.  While in the emergency room, patient 

sustained a 1 minute tonic-clonic seizure, new onset.  Patient with chronic 

indwelling urinary catheter, UA positive for leukocytes and nitrates.  Freed 

catheter was replaced at ED.  Laboratory workup revealing WBC 6.3, Hgb 11.8, 

platelet count 274.  Sodium 140, potassium 3.8, BUN/creatinine 15/0.52.  

Albumin 3.4.  LFTs within normal limits.  Neurosurgery, Dr. Park consulted.  

Nonsurgical support recommended.  Intensivist Dr. Garcia consulted for further 

management.  Patient was admitted to intensive care unit.  Palliative care has 

been consulted for further clarifications of goals of care.





Patient with history of severe left frontal intraparenchymal hematoma with mass 

effect and midline shift status post craniotomy with evacuation of hematoma on 

November 2016.  During that acute admission, patient required intubation and 

mechanical ventilation.  She was medically extubated on 12/4/16.  PEG tube 

placed on 11/30/16,  cranioplasty 12/23/16.  Patient reported as independent 

with ADLs prior to this event.  She was subsequently discharged on December 28 

to [facility] for long-term care.  She was later transferred to the [facility] where she has been residing since.  Reviewed prior 

medical records, ED visit on 5/5/17 secondary to PEG tube malfunction, same was 

replaced.  ED visit on 5/21/17 for evaluation of lump over left eyebrow.  She 

was discharged back to the [facility].





Patient seen in ICU.  She was found in bed in no acute distress.  Alert, 

nonverbal, not following any commands or attempting to communicate.  Left-sided 

facial droop observed.  Patient severely contracted.  Bilateral left foot drop 

noted.  No signs of discomfort noted.  Patient afebrile, stable 

hemodynamically.  Currently tolerating room air at 100%.  Laboratory workup 

mostly unchanged from yesterday.





Telephone conversation with patient's daughters Jordana and Anel and bedside 

conversation with daughter Heather Swanson who is local.  Obtained past medical 

history and psychosocial history.  Medical update provided.  Reviewed events 

leading to this hospitalization, clinical course and current medical 

management.  CT of the brain report provided by [facility] and 

discussed nonsurgical management recommendations by neurosurgery.  Family tells 

me that their main goal at this time is patient's comfort and quality of life.  

Family verbalized that if bleeding continued to progressed and in the setting 

of no surgical interventions recommended, family considering returning patient 

to her long-term facility under hospice services.  Family with a very good 

insight regarding patient's clinical condition and ongoing multiple 

comorbidities to include her very debilitated bedbound state at baseline.  

Family confirmed that patient is no code/DNR DNI.  All questions have been 

answering great detail.  Family appreciative of my visit today.  Receptive to 

palliative care follow up.





Case discussed with bedside RN Maria A.


.


Function/Cognitive Trajectory


Patient with history of hemorrhaic Brain bleed status post craniotomy on 

November 2016.  Resident of long-term facility since discharge in December 28, 2016.  At baseline, patient is bedbound, nonverbal, not following commands.  

Nutrition via PEG tube.  Contracted with muscular atrophy noted.


.





Review of Systems


ROS Limitations:  Clinical Condition (nonverbal, not following commands.  

History of brain bleed.)


Constitutional:  COMPLAINS OF: Pain, Generalized weakness, DENIES: Fever


Ears, nose, mouth, throat:  DENIES: Nasal discharge, Running Nose


Respiratory:  DENIES: Cough, Shortness of breath


Cardiovascular:  DENIES: Lower Extremity Edema


Gastrointestinal:  DENIES: Vomiting


Genitourinary:  COMPLAINS OF: Urinary incontinence (chronic indwelling urinary 

catheter)


Musculoskeletal:  COMPLAINS OF: Stiffness (contracted extremities)


Integumentary:  DENIES: Rash


Hematologic/Lymphatics:  DENIES: Bruising


Immunologic/Allergic:  DENIES: Eczema


Neurologic:  COMPLAINS OF: Localized weakness, Seizures, DENIES: Tremor


Psychiatric:  COMPLAINS OF: Anxiety


Other ROS:


Limited ROS secondary to clinical condition.  ROS obtained from medical records

, patient's family and clinical observation.


.





Past Family Social History


Coded Allergies:  


     No Known Allergies (Unverified , 5/22/17)


Past Medical History


Dementia


Hypertension


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


Brain bleed in 2016


Arthritis


.


Past Surgical History


Left frontotemporoparietal  craniectomy evacuation of intracerebral hematoma on 

November 2016


Left frontaltemporalparietal cranioplasty on December 2016


PEG tube placement on 11/30/16


PEG tube replacement on 5/5/17


Hysterectomy


.


Reported Medications


Lisinopril 10 Mg Tab 20 Mg PO Q12HR 30 Days


Hydrocodone-Acetaminophen 5-325 mg Tab 1 Tab PEG Q4H PRN


Zinc Sulfate 220 Mg Tab 220 Mg PEG DAILY


Zantac (Ranitidine HCl) 150 Mg Tab 150 Mg PEG DAILY


Enema Disposable (Sodium Phosphates) 1 Tasha Tasha 1 Applic RECTAL IN THE AM PRN


Dulcolax Supp (Bisacodyl) 10 Mg Supp 10 Mg RECTAL IN THE AM PRN


Tylenol (Acetaminophen) 325 Mg Tab 650 Mg PEG Q4H PRN


Milk of Magnesia Liq (Magnesium Hydroxide) 400 Mg/5 Ml Susp 30 Ml PEG HS PRN


Donepezil 5 Mg Tab 5 Mg PEG HS


Magnesium Citrate Liq (Magnesium Citrate) 300 Ml Liq 300 Ml PEG IN THE AM PRN


Ascorbic Acid 500 Mg Tab 500 Mg PEG DAILY


Amlodipine (Amlodipine Besylate) 5 Mg Tab 5 Mg PEG DAILY


.





Current Medications








 Medications


  (Trade)  Dose


 Ordered  Sig/Taina


 Route  Start Time


 Stop Time Status Last Admin


 


  (Zofran Inj)  4 mg  Q6H  PRN


 IV  8/24/17 17:30


     


 


 


  (Tylenol)  650 mg  Q4H  PRN


 PO  8/24/17 17:30


     


 


 


  (Tylenol Supp)  650 mg  Q4H  PRN


 RECTAL  8/24/17 17:30


     


 


 


  (NS Flush)  2 ml  BID


 IV FLUSH  8/24/17 21:00


    8/25/17 09:00


 


 


  (NS Flush)  2 ml  UNSCH  PRN


 IVF  8/24/17 17:30


     


 


 


  (Norvasc)  5 mg  DAILY


 G-TUBE  8/25/17 09:00


    8/25/17 08:59


 


 


  (Vitamin C)  500 mg  DAILY


 G-TUBE  8/25/17 09:00


    8/25/17 09:00


 


 


  (Aricept)  5 mg  HS


 G-TUBE  8/24/17 21:00


    8/24/17 20:45


 


 


  (Norco  5-325 Mg)  1 tab  Q4H  PRN


 PEG  8/24/17 17:45


     


 


 


  (Prevacid Odt)  30 mg  DAILY


 G-TUBE  8/25/17 09:00


    8/25/17 08:59


 


 


  (Prinivil)  20 mg  Q12HR


 PO  8/24/17 21:00


    8/25/17 09:00


 


 


  (Milk Of


 Magnesia Liq)  30 ml  Q6H  PRN


 PO  8/24/17 17:45


     


 


 


  (Detrol La)  2 mg  DAILY


 PO  8/24/17 09:00


    8/25/17 09:00


 


 


 Nicardipine HCl


 25 mg/Sodium


 Chloride  260 ml @ 


 52 mls/hr  Q5H PRN


 IV  8/24/17 21:28


     


 








Family History


Patient has 5 children who are alive and well.


.


.


Substance Use


Tobacco: None.


Alcohol: None.


Prescription med abuse: None.


Illicits: None.


.


Psychosocial History


Patient is .  She has 5 children.  Residing a long-term facility since 

December 2016.  Prior to this, patient was residing with her daughter and was 

independent with all ADLs.


.


Health Care Surrogate:  Completed, but not made available


Health Care Surrogate(s):


Pending copy of AD.  As per patient's family, daughter Jordana is healthcare 

surrogate decision maker. 


.


Family/friends goals:


DNR/DNI.  Family considering hospice services at this time.


.


Ethical and Legal Issues


No ethical or legal issues identified at this time.





Physical Exam





Vital Signs








  Date Time  Temp Pulse Resp B/P (MAP) Pulse Ox O2 Delivery O2 Flow Rate FiO2


 


8/25/17 10:00  74      


 


8/25/17 08:00  74      


 


8/25/17 07:00     100 Room Air  


 


8/25/17 07:00  76      


 


8/25/17 06:00  73      


 


8/25/17 04:00  78      


 


8/25/17 04:00 98.9 78 15 107/60 (76) 99   


 


8/25/17 02:00  77      


 


8/25/17 00:00 98.7 83 15 129/68 (88) 99   


 


8/25/17 00:00  74      


 


8/24/17 23:00  78      


 


8/24/17 23:00  80  123/72    


 


8/24/17 22:00  80      


 


8/24/17 21:16  89  161/76    


 


8/24/17 20:45  90  177/83    


 


8/24/17 20:00     99 Room Air  


 


8/24/17 20:00 98.9 99 14 163/85 (111) 98   


 


8/24/17 20:00  99      


 


8/24/17 19:20     99   


 


8/24/17 18:53  96 16 146/78 (100) 99   


 


8/24/17 18:01  93 16 148/69 (95) 100 Room Air  


 


8/24/17 17:58        21


 


8/24/17 17:46  112 16 164/89 (114) 100 Room Air  


 


8/24/17 17:13  118      


 


8/24/17 17:11  124 15 199/95 (129) 100 Room Air  


 


8/24/17 16:00  133 16 227/108 (147) 98 Room Air  


 


8/24/17 14:51  95   100   


 


8/24/17 14:48 97.8 87 14 156/80 (105) 99   








Exam


CONSTITUTIONAL/GENERAL: This is an frail, elderly female in bed in no acute 

distress. 


TUBES/LINES/DRAINS: PIV's, Freed catheter.


SKIN: No jaundice, rashes, or lesions.  No wounds seen anteriorly. Skin 

temperature appropriate. Not diaphoretic. 


HEAD: Atraumatic. Normocephalic.  Temporal muscle wasting bilaterally.


EYES:  No scleral icterus. No injection or drainage.  


ENT: Unable to evaluate hearing.  Nose without bleeding or purulent drainage.  

Moist oral mucosa.


NECK: Trachea midline. Supple. 


CARDIOVASCULAR: Regular rate and rhythm without murmurs, gallops, or rubs. No 

JVD. Peripheral pulses symmetric.


RESPIRATORY/CHEST: Symmetric, unlabored respirations. Clear, diminished to 

auscultation. Breath sounds equal bilaterally. No wheezes, rales, or rhonchi.  


GASTROINTESTINAL: Abdomen soft, non-tender, nondistended.  Bowel sounds 

present.  PEG tube in place.


GENITOURINARY: Without palpable bladder distension. Freed catheter in place.


MUSCULOSKELETAL: Muscular atrophy in all 4 extremities.  Contracted 

extremities.  Bilateral foot drop.  No mottling or clubbing.


NEUROLOGICAL: Awake and alert.  Eyes open, not tracking.  Not following 

commands.  Nonverbal.  Left-sided facial droop noted.


PSYCHIATRIC: Unable to evaluate given clinical condition.  Appears calm.


.





Diagnostic Tests


Laboratory





Laboratory Tests








Test


  8/24/17


15:00 8/24/17


19:45 8/25/17


04:18


 


White Blood Count


  6.3 TH/MM3


(4.0-11.0) 


  6.3 TH/MM3


(4.0-11.0)


 


Red Blood Count


  5.52 MIL/MM3


(4.00-5.30) 


  5.50 MIL/MM3


(4.00-5.30)


 


Hemoglobin


  11.8 GM/DL


(11.6-15.3) 


  12.0 GM/DL


(11.6-15.3)


 


Hematocrit


  38.4 %


(35.0-46.0) 


  38.4 %


(35.0-46.0)


 


Mean Corpuscular Volume


  69.5 FL


(80.0-100.0) 


  69.8 FL


(80.0-100.0)


 


Mean Corpuscular Hemoglobin


  21.4 PG


(27.0-34.0) 


  21.8 PG


(27.0-34.0)


 


Mean Corpuscular Hemoglobin


Concent 30.8 %


(32.0-36.0) 


  31.2 %


(32.0-36.0)


 


Red Cell Distribution Width


  17.9 %


(11.6-17.2) 


  17.6 %


(11.6-17.2)


 


Platelet Count


  274 TH/MM3


(150-450) 


  229 TH/MM3


(150-450)


 


Mean Platelet Volume


  10.1 FL


(7.0-11.0) 


  10.7 FL


(7.0-11.0)


 


Neutrophils (%) (Auto)


  56.3 %


(16.0-70.0) 


  74.7 %


(16.0-70.0)


 


Lymphocytes (%) (Auto)


  33.7 %


(9.0-44.0) 


  15.2 %


(9.0-44.0)


 


Monocytes (%) (Auto)


  7.7 %


(0.0-8.0) 


  9.0 %


(0.0-8.0)


 


Eosinophils (%) (Auto)


  1.1 %


(0.0-4.0) 


  0.2 %


(0.0-4.0)


 


Basophils (%) (Auto)


  1.2 %


(0.0-2.0) 


  0.9 %


(0.0-2.0)


 


Neutrophils # (Auto)


  3.6 TH/MM3


(1.8-7.7) 


  4.7 TH/MM3


(1.8-7.7)


 


Lymphocytes # (Auto)


  2.1 TH/MM3


(1.0-4.8) 


  1.0 TH/MM3


(1.0-4.8)


 


Monocytes # (Auto)


  0.5 TH/MM3


(0-0.9) 


  0.6 TH/MM3


(0-0.9)


 


Eosinophils # (Auto)


  0.1 TH/MM3


(0-0.4) 


  0.0 TH/MM3


(0-0.4)


 


Basophils # (Auto)


  0.1 TH/MM3


(0-0.2) 


  0.1 TH/MM3


(0-0.2)


 


CBC Comment DIFF FINAL   DIFF FINAL 


 


Differential Comment     


 


Urine Color


  YELLOW


(YELLW/STRAW) 


  


 


 


Urine Turbidity CLOUDY (CLEAR)   


 


Urine pH 7.5 (5.0-8.5)   


 


Urine Specific Gravity


  1.010


(1.002-1.035) 


  


 


 


Urine Protein


  TRACE mg/dL


(NEG-TRACE) 


  


 


 


Urine Glucose (UA)


  NEG mg/dL


(NEG) 


  


 


 


Urine Ketones


  NEG mg/dL


(NEG) 


  


 


 


Urine Occult Blood TRACE (NEG)   


 


Urine Nitrite POS (NEG)   


 


Urine Bilirubin NEG (NEG)   


 


Urine Urobilinogen


  LESS THAN 2.0


MG/DL (LESS 


  


 


 


Urine Leukocyte Esterase LARGE (NEG)   


 


Urine RBC 6 /hpf (0-3)   


 


Urine WBC 91 /hpf (0-5)   


 


Urine Squamous Epithelial


Cells 3 /hpf (0-5) 


  


  


 


 


Urine Amorphous Sediment RARE   


 


Urine Bacteria


  MANY /hpf


(NONE) 


  


 


 


Microscopic Urinalysis Comment


  CULTURE


INDICATED 


  


 


 


Blood Urea Nitrogen


  15 MG/DL


(7-18) 


  14 MG/DL


(7-18)


 


Creatinine


  0.52 MG/DL


(0.50-1.00) 


  0.51 MG/DL


(0.50-1.00)


 


Random Glucose


  95 MG/DL


() 


  103 MG/DL


()


 


Total Protein


  8.3 GM/DL


(6.4-8.2) 


  7.7 GM/DL


(6.4-8.2)


 


Albumin


  3.4 GM/DL


(3.4-5.0) 


  3.1 GM/DL


(3.4-5.0)


 


Calcium Level


  10.2 MG/DL


(8.5-10.1) 


  9.9 MG/DL


(8.5-10.1)


 


Alkaline Phosphatase


  118 U/L


() 


  117 U/L


()


 


Aspartate Amino Transf


(AST/SGOT) 17 U/L (15-37) 


  


  16 U/L (15-37) 


 


 


Alanine Aminotransferase


(ALT/SGPT) 20 U/L (10-53) 


  


  18 U/L (10-53) 


 


 


Total Bilirubin


  0.4 MG/DL


(0.2-1.0) 


  0.4 MG/DL


(0.2-1.0)


 


Sodium Level


  140 MEQ/L


(136-145) 


  141 MEQ/L


(136-145)


 


Potassium Level


  3.8 MEQ/L


(3.5-5.1) 


  3.5 MEQ/L


(3.5-5.1)


 


Chloride Level


  104 MEQ/L


() 


  105 MEQ/L


()


 


Carbon Dioxide Level


  29.3 MEQ/L


(21.0-32.0) 


  29.4 MEQ/L


(21.0-32.0)


 


Anion Gap 7 MEQ/L (5-15)   7 MEQ/L (5-15) 


 


Estimat Glomerular Filtration


Rate 140 ML/MIN


(>89) 


  143 ML/MIN


(>89)


 


Nasal Screen MRSA (PCR)


  


  MRSA DETECTED


(NOT DETECT) 


 


 


Prothrombin Time


  


  


  11.5 SEC


(9.8-11.6)


 


Prothromb Time International


Ratio 


  


  1.0 RATIO 


 








Result Diagram:  


8/25/17 0418                                                                   

             8/25/17 0418





Microbiology





Microbiology








 Date/Time


Source Procedure


Growth Status


 


 


 8/24/17 15:00


Urine Clean Catch Urine Culture


Pending Received











Patient/Family Conference


Family Conference Time (mins):  42


Family Conference Location:  [facility], Telephone


Issues Discussed:


* Palliative care role, purpose, approach


* Additional medical, psychosocial, and spiritual history


* Patients general health, functional status, and cognitive changes in the 

months leading up to the current hospitalization


* Family understanding of the current medical problems -Brain bleed with new 

onset of seizure activity.


* Family understanding of prognosis -poor prognosis for an improved quality of 

life


* Patients goals of care as best understood from advance directives and/or 

conversations and/or values


* Current medical treatment options and benefits/burdens of those options


* Likely scenarios comparing ongoing aggressive care with a transition to 

comfort measures only with hospice


* Questions answered to the best of my ability


* Palliative care contact information provided


* Hospice philosophy and benefits


.





Assessment and Plan


Disease Oriented Problem List:  


(1) Intraparenchymal hemorrhage of brain


(2) New onset seizure


(3) HTN (hypertension)


(4) Dementia


(5) Physical deconditioning


Symptom Scale:  


(1) Pain


0-10 Scale:  Unable to quantify


Comment:  History of arthritis.  Bedbound state.





(2) Debility


0-10 Scale:  Unable to quantify


Comment:  Progressive since brain bleed in November 2016.





Pertinent Non-Medical Issues


Psychosocial: Patient is .  She has 5 children.  Residing a long-term 

facility since December 2016.


Spiritual: No Buddhist affiliation.


Legal: Advance directives completed.  Pending copy.


Ethical issues impacting care: Patient unable to participating in medical 

decision-making.  Daughter Jordana presented as healthcare surrogate decision 

maker.


.


Important Contacts


HCS/daughter: Jordana [phone]


Daughter Heather Swanson [phone] & [phone]


Daughter Anel Judd [phone].


.


Prognosis


Mrs. Obregon is a 72-year-old female with a past medical history significant 

for brain bleed, dementia, hypertension, seizure disorder and renal failure.  

Patient presented from [facility] on 8/24/17 for 

evaluation of parenchymal hematoma measuring 1.5 cm x 0.8 cm discovered by CT 

scan. Neurosurgery, Dr. Park consulted.  Nonsurgical support recommended.  

Patient resident of long-term facility, bedbound and nonverbal at baseline.  

Overall prognosis is poor given advanced age, ongoing chronic comorbidities and 

total care status.  Patient appears hospice appropriate should family elects 

comfort-directed care.


.


Code Status:  No Code


Plan





* CODE STATUS: No code.  DNR/DNI.  All 3 daughters Heather Silveira and Anel in 

agreement with no code.





* HEALTHCARE DECISION-MAKING: Patient unable to participating in medical 

decision-making given her clinical condition, nonverbal status post CVA.  

Patient's family reports that sabino Silveira is healthcare surrogate decision 

maker, pending ad. 





* GOALS OF CARE: Family electing supportive care at this time.  Pending imaging 

for reevaluation of brain bleed as discussed with neurosurgery, Dr. Park.  

Family understand that follow-up imaging is not likely to change the 

nonsurgical medical  management or overall outcome.  Family considering 

returning patient back to long-term facility with hospice services, receptive 

to hospice informative visit.  Referral has been made.  Hospice philosophy and 

benefits has been introduced to family.  Palliative care has spoken to 3 

daughter Heather Silveira and Anel who are in agreement with treatment plan. 





* SYMPTOMS: = Pain, patient bedbound since November 2016.  Resident of long-

term facility.  Pain likely secondary to bedbound state and history of 

arthritis.  Norco 5/325 Q4h PRN available. = Debility, progressive since 

November 2016.  Likely to continue to worsen.





* Case has been discussed with Dr. Fine and bedside RN Candi. 


* Palliative care contact information has been provided to patient's family.


* Palliative care will continue to follow-up for further clarifications of 

goals of care as patient's clinical course continues to evolve.


.





Time Spent


Total Floor Time (mins):  72 (Total time to include review and summarization of 

available medical records to include prior acute hospitalizations and ED visits

, physical exam, goals of care conversation with sabino Romero, telephone 

conversation with daughters Jordana and Anel, case discussion with Dr. Fine 

and bedside RN.)


>50% Counseling/Coord of Care:  Yes





Thank you for the opportunity to participate in the care of Ms. Obregon.





Attestation


To help prompt me to consider important information that might be impacting 

today's encounter and assessment, information from prior notes written by 

myself or my colleagues may have been "brought forward" into today's note.  My 

signature on this note, however, is an attestation that I personally performed 

the exam, history, and/or decision-making noted today, and, unless otherwise 

indicated, the interactions with patient, family, and staff as well as the 

review of records all occurred today.  I also attest that the listed assessment 

and stated plan reflect my best clinical judgment today based on the 

combination of historical information, prior notes, and today's exam/ 

interactions.  When time spent is documented, it refers only to time spent 

today by the signer, or if indicated, combined time spent today by 

collaborating physician/nurse practitioner.











Isidra Louise Aug 25, 2017 11:57

## 2017-08-26 VITALS
RESPIRATION RATE: 16 BRPM | SYSTOLIC BLOOD PRESSURE: 146 MMHG | OXYGEN SATURATION: 98 % | TEMPERATURE: 99.1 F | DIASTOLIC BLOOD PRESSURE: 69 MMHG | HEART RATE: 93 BPM

## 2017-08-26 VITALS
TEMPERATURE: 99 F | SYSTOLIC BLOOD PRESSURE: 134 MMHG | RESPIRATION RATE: 16 BRPM | DIASTOLIC BLOOD PRESSURE: 80 MMHG | HEART RATE: 89 BPM | OXYGEN SATURATION: 98 %

## 2017-08-26 VITALS
OXYGEN SATURATION: 99 % | TEMPERATURE: 99.5 F | RESPIRATION RATE: 17 BRPM | SYSTOLIC BLOOD PRESSURE: 153 MMHG | HEART RATE: 95 BPM | DIASTOLIC BLOOD PRESSURE: 75 MMHG

## 2017-08-26 VITALS
DIASTOLIC BLOOD PRESSURE: 86 MMHG | TEMPERATURE: 99.6 F | HEART RATE: 101 BPM | RESPIRATION RATE: 16 BRPM | OXYGEN SATURATION: 97 % | SYSTOLIC BLOOD PRESSURE: 153 MMHG

## 2017-08-26 VITALS — DIASTOLIC BLOOD PRESSURE: 78 MMHG | SYSTOLIC BLOOD PRESSURE: 152 MMHG | HEART RATE: 90 BPM

## 2017-08-26 VITALS
DIASTOLIC BLOOD PRESSURE: 68 MMHG | TEMPERATURE: 98.4 F | RESPIRATION RATE: 15 BRPM | OXYGEN SATURATION: 97 % | HEART RATE: 77 BPM | SYSTOLIC BLOOD PRESSURE: 132 MMHG

## 2017-08-26 VITALS — SYSTOLIC BLOOD PRESSURE: 153 MMHG | DIASTOLIC BLOOD PRESSURE: 86 MMHG | HEART RATE: 101 BPM

## 2017-08-26 VITALS — HEART RATE: 85 BPM

## 2017-08-26 VITALS — HEART RATE: 89 BPM

## 2017-08-26 RX ADMIN — LISINOPRIL SCH MG: 20 TABLET ORAL at 08:09

## 2017-08-26 RX ADMIN — ENALAPRILAT PRN MG: 1.25 INJECTION INTRAVENOUS at 00:28

## 2017-08-26 RX ADMIN — TOLTERODINE TARTRATE SCH MG: 2 CAPSULE, EXTENDED RELEASE ORAL at 08:07

## 2017-08-26 RX ADMIN — LANSOPRAZOLE SCH MG: 30 TABLET, ORALLY DISINTEGRATING, DELAYED RELEASE ORAL at 08:09

## 2017-08-26 RX ADMIN — HYDROCODONE BITARTRATE AND ACETAMINOPHEN PRN TAB: 5; 325 TABLET ORAL at 02:57

## 2017-08-26 RX ADMIN — OXYCODONE HYDROCHLORIDE AND ACETAMINOPHEN SCH MG: 500 TABLET ORAL at 08:08

## 2017-08-26 RX ADMIN — Medication SCH ML: at 08:10

## 2017-08-26 NOTE — EKG
Date Performed: 08/25/2017       Time Performed: 18:29:21

 

PTAGE:      72 years

 

EKG:      Sinus rhythm 

 

 NORMAL ECG

 

PREVIOUS TRACING       : 08/25/2017 12.07 Compared to prior tracing no significant change

 

DOCTOR:   Elton Esquivel  Interpretating Date/Time  08/26/2017 18:26:15

## 2017-08-26 NOTE — HHI.NSPN
__________________________________________________





Note Status


Status:  Progress Note





Interval History


Interval History


This is a 72 year old female who was brought to [place] emergency department 

with altered mental status. She has history of prior ICH and a prior craniotomy 

in 2016. She had a hemorrhagic stroke and she is at baseline aphasic. In 

addition nshe is bedridden and is fed by a G-tube. She is a resident  [facility] 

[facility], followed by Dr. Winston. 





She was taken  to the emergency department having had a CT scan done for 

increased generalized weakness and lethargy. She was seen at [facility] ER and a CT 

scan showed a 1 cm lesion in the right parietal lobe which is hyperdense 

concerning for an intracranial hemorrhage. Upon presentation to [facility] ER she 

was hemodynamically stable. She had a generalized tonic clonic seizure. No 

tongue bitting. No incontinence of stool. No incontinence of urine. she was 

given anticonvulsants





8/25. Neurologically stable. No further seizures





8/26. No new issues. Seuizures are controlled. CT scan from yesterday reviewed 

and discussed findings with her daughter.





Labs, Micro, & Vital Signs


Results











  Date Time  Temp Pulse Resp B/P (MAP) Pulse Ox O2 Delivery O2 Flow Rate FiO2


 


8/26/17 16:00 99.0 89 16 134/80 (98) 98   


 


8/26/17 12:52  89      


 


8/26/17 12:00 99.5 95 17 153/75 (101) 99   


 


8/26/17 08:00 99.1 93 16 146/69 (94) 98   


 


8/26/17 05:03  85      


 


8/26/17 03:45 98.4 77 15 132/68 (89) 97   


 


8/26/17 01:50  90  152/78 (102)    


 


8/26/17 01:40 99.6 101 16 153/86 (108) 97   


 


8/26/17 00:20  101  153/86 (108)    


 


8/25/17 22:00  86      


 


8/25/17 20:30    140/81 (100)    


 


8/25/17 20:00  96      


 


8/25/17 20:00     100 Room Air  


 


8/25/17 20:00 99.2 96 24 169/95 (119) 100   








Constitutional





Vital Signs








  Date Time  Temp Pulse Resp B/P (MAP) Pulse Ox O2 Delivery O2 Flow Rate FiO2


 


8/26/17 16:00 99.0 89 16 134/80 (98) 98   


 


8/26/17 12:52  89      


 


8/26/17 12:00 99.5 95 17 153/75 (101) 99   


 


8/26/17 08:00 99.1 93 16 146/69 (94) 98   


 


8/26/17 05:03  85      


 


8/26/17 03:45 98.4 77 15 132/68 (89) 97   


 


8/26/17 01:50  90  152/78 (102)    


 


8/26/17 01:40 99.6 101 16 153/86 (108) 97   


 


8/26/17 00:20  101  153/86 (108)    


 


8/25/17 22:00  86      


 


8/25/17 20:30    140/81 (100)    


 


8/25/17 20:00  96      


 


8/25/17 20:00     100 Room Air  


 


8/25/17 20:00 99.2 96 24 169/95 (119) 100   











Physical Exam


Ms Obregon  open eyes to coice, aphasic. Appears comfortable





Cranial nerve examination: pupils to be equal, round and reactive to light.  

Extra-ocular movements shows mild gaze preference but conjugated. No papiledema


Right facial supranuclear droop and decreased sensory function on the right.  

Gross hearing appears intact.


Sternocleidomastoid and trapezius muscles are symmetrical.  Other cranial 

nerves are intact.





Neck is soft and supple with a good range of motion without pain.





Muscle strength right spastic hemiplegia, moves purposfully left side








Sensory examination is intact to  pin prick in left upper and lower extremities

, decreased on right. Unable to assess light touch





Deep tendon reflexes are 3+ on the right. Right Babinsky, left plantar flexion 

response.





Cerebellar examination can not be assessed due to her neurological condition





Medications


Current Medications





Current Medications


Lorazepam (Ativan Inj) 2 mg ONCE  ONCE IM  Last administered on 8/24/17at 15:34

;  Start 8/24/17 at 15:30;  Stop 8/24/17 at 15:31;  Status DC


Lorazepam (Ativan Inj) 2 mg STK-MED ONCE .ROUTE ;  Start 8/24/17 at 15:28;  

Stop 8/24/17 at 15:29;  Status DC


Phenytoin Sodium 1000 mg/Sodium Chloride 120 ml @  288 mls/hr ONCE  ONCE IV  

Last administered on 8/24/17at 17:09;  Start 8/24/17 at 15:45;  Stop 8/24/17 at 

16:09;  Status DC


Ceftriaxone Sodium 1000 mg/ Sodium Chloride 100 ml @  200 mls/hr ONCE  ONCE IV  

Last administered on 8/24/17at 17:58;  Start 8/24/17 at 17:00;  Stop 8/24/17 at 

17:29;  Status DC


Nicardipine HCl 25 mg/Sodium Chloride 260 ml @ 0 mls/hr TITRATE  ONCE IV  Last 

administered on 8/24/17at 20:45;  Start 8/24/17 at 17:15;  Stop 8/24/17 at 17:16

;  Status DC


Ondansetron HCl (Zofran Inj) 4 mg Q6H  PRN IV NAUSEA OR VOMITING;  Start 8/24/ 17 at 17:30


Acetaminophen (Tylenol) 650 mg Q4H  PRN PO Temp>101F, Headache;  Start 8/24/17 

at 17:30


Acetaminophen (Tylenol Supp) 650 mg Q4H  PRN RECTAL Temp>101F, Headache;  Start 

8/24/17 at 17:30


Sodium Chloride (NS Flush) 2 ml BID IV FLUSH  Last administered on 8/26/17at 08:

10;  Start 8/24/17 at 21:00


Sodium Chloride (NS Flush) 2 ml UNSCH  PRN IVF FLUSH AFTER USING IV ACCESS;  

Start 8/24/17 at 17:30


Amlodipine Besylate (Norvasc) 5 mg DAILY G-TUBE  Last administered on 8/26/17at 

08:08;  Start 8/25/17 at 09:00


Ascorbic Acid (Vitamin C) 500 mg DAILY G-TUBE  Last administered on 8/26/17at 08

:08;  Start 8/25/17 at 09:00


Donepezil HCl (Aricept) 5 mg HS G-TUBE  Last administered on 8/25/17at 20:37;  

Start 8/24/17 at 21:00


Acetaminophen/ Hydrocodone Bitart (Norco  5-325 Mg) 1 tab Q4H  PRN PEG PAIN 

SCALE 1 TO 10 Last administered on 8/26/17at 02:57;  Start 8/24/17 at 17:45


Lansoprazole (Prevacid Odt) 30 mg DAILY G-TUBE  Last administered on 8/26/17at 

08:09;  Start 8/25/17 at 09:00


Lisinopril (Prinivil) 20 mg Q12HR PO  Last administered on 8/26/17at 08:09;  

Start 8/24/17 at 21:00


Magnesium Hydroxide (Milk Of Magnesia Liq) 30 ml Q6H  PRN PO CONSTIPATION;  

Start 8/24/17 at 17:45


Non-Formulary Medication 5 mg DAILY PO ;  Start 8/25/17 at 09:00;  Status UNV


Tolterodine Tartrate (Detrol La) 2 mg DAILY PO  Last administered on 8/26/17at 

08:07;  Start 8/24/17 at 09:00


Nicardipine HCl 25 mg/Sodium Chloride 260 ml @  52 mls/hr Q5H PRN IV Blood 

pressure management;  Start 8/24/17 at 21:28


Enalaprilat (Vasotec  Inj) 1.25 mg Q6H  PRN IV PUSH SBP>150, DBP>90 Last 

administered on 8/26/17at 00:28;  Start 8/25/17 at 15:00





Medical Decision Making


MDM Remarks





Last Impressions








Head CT 8/25/17 0000 Signed





Impressions: 





 Service Date/Time:  Friday, August 25, 2017 16:52 - CONCLUSION:  1. 

Nonspecific 





 14 mm area of high density in the subcortical white matter of the right 

parietal 





 lobe. Could represent an area of contusion or acute hemorrhage from an 

uncertain 





 etiology. New finding from the prior study. Suggest followup to confirm 





 resolution. 2. Otherwise, stable chronic changes including severe left 

cerebral 





 atrophy with a stable dilatation of the lateral ventricles, left larger than 





 right. There is also stable severe periventricular white matter change 





 representing chronic microvascular ischemia.     Ray Sharp MD 











Attending Statement


Neuro.  Stable,  CT scan from yesterday reviewed and discussed findings with 

her daughter,.paliative care consultation noted. She will go for hospice care





Continue Antihypertensives to keep her blood pressure controlled





Continue Keppra for prophylaxis of seizures. 





Pulmonary.  Continue  pulmonary toilette, nasotracheal suction, and breathing 

treatments with nebulizers.





Renal.  Continue to monitor closely urine output, BUN and creatinine





Endocrine. continue to Monitor serial Acu checks and SSI as needed in detail





ID continue to monitor for signs of infection





continue Protonix for stress ulcer prophylaxis





continue Wilber hose and SCD's for DVT prophylaxis





Discharge to hospice care











Marc Park MD Aug 26, 2017 18:15

## 2017-08-26 NOTE — HHI.DS
Discharge Summary


Admission Date


Aug 24, 2017 at 17:14


Admitting Diagnosis





intracranial hemorrhage





(1) Intraparenchymal hemorrhage of brain


ICD Codes:  I61.9 - Nontraumatic intracerebral hemorrhage, unspecified


Status:  Acute


CBC/BMP:  


8/25/17 0418                                                                   

             8/25/17 0418





Significant Findings





Laboratory Tests








Test


  8/24/17


15:00 8/24/17


19:45 8/25/17


04:18


 


Red Blood Count


  5.52 MIL/MM3


(4.00-5.30) 


  5.50 MIL/MM3


(4.00-5.30)


 


Mean Corpuscular Volume


  69.5 FL


(80.0-100.0) 


  69.8 FL


(80.0-100.0)


 


Mean Corpuscular Hemoglobin


  21.4 PG


(27.0-34.0) 


  21.8 PG


(27.0-34.0)


 


Mean Corpuscular Hemoglobin


Concent 30.8 %


(32.0-36.0) 


  31.2 %


(32.0-36.0)


 


Red Cell Distribution Width


  17.9 %


(11.6-17.2) 


  17.6 %


(11.6-17.2)


 


Urine Turbidity CLOUDY (CLEAR)   


 


Urine Occult Blood TRACE (NEG)   


 


Urine Nitrite POS (NEG)   


 


Urine Leukocyte Esterase LARGE (NEG)   


 


Urine RBC 6 /hpf (0-3)   


 


Urine WBC 91 /hpf (0-5)   


 


Urine Bacteria


  MANY /hpf


(NONE) 


  


 


 


Total Protein


  8.3 GM/DL


(6.4-8.2) 


  


 


 


Calcium Level


  10.2 MG/DL


(8.5-10.1) 


  


 


 


Alkaline Phosphatase


  118 U/L


() 


  


 


 


Neutrophils (%) (Auto)


  


  


  74.7 %


(16.0-70.0)


 


Monocytes (%) (Auto)


  


  


  9.0 %


(0.0-8.0)


 


Albumin


  


  


  3.1 GM/DL


(3.4-5.0)








Pt Condition on Discharge:  Deteriorating


Discharge Disposition:  Hospice/Med Facility


Discharge Instructions


DIET: Follow Instructions for:  On Tube Feeding


Activities you can perform:  Continue Bedrest


Additional Activity Instructio:  


discharge to snf  with hospice care


Additional Information


pt with new right parietal hcva   will dc to hospice at [facility]   continue tube 

feedings











Nestor Mccloud DO Aug 26, 2017 16:43

## 2017-08-26 NOTE — EKG
Date Performed: 08/25/2017       Time Performed: 12:07:10

 

PTAGE:      72 years

 

EKG:      Sinus rhythm 

 

. Normal ECG

 

PREVIOUS TRACING       : 08/24/2017 19.06 Compared to prior tracing no significant change

 

DOCTOR:   Elton Esquivel  Interpretating Date/Time  08/26/2017 18:43:15

## 2017-09-18 ENCOUNTER — HOSPITAL ENCOUNTER (EMERGENCY)
Dept: HOSPITAL 17 - NEPE | Age: 72
Discharge: HOSPICE | End: 2017-09-18
Payer: MEDICARE

## 2017-09-18 VITALS
RESPIRATION RATE: 16 BRPM | TEMPERATURE: 97.9 F | DIASTOLIC BLOOD PRESSURE: 84 MMHG | HEART RATE: 80 BPM | SYSTOLIC BLOOD PRESSURE: 176 MMHG

## 2017-09-18 VITALS
DIASTOLIC BLOOD PRESSURE: 87 MMHG | OXYGEN SATURATION: 99 % | SYSTOLIC BLOOD PRESSURE: 183 MMHG | HEART RATE: 89 BPM | RESPIRATION RATE: 16 BRPM

## 2017-09-18 VITALS — WEIGHT: 143.3 LBS | BODY MASS INDEX: 23.03 KG/M2 | HEIGHT: 66 IN

## 2017-09-18 DIAGNOSIS — I10: ICD-10-CM

## 2017-09-18 DIAGNOSIS — Z46.59: ICD-10-CM

## 2017-09-18 DIAGNOSIS — K94.23: Primary | ICD-10-CM

## 2017-09-18 LAB
ANION GAP SERPL CALC-SCNC: 5 MEQ/L (ref 5–15)
APTT BLD: 25.6 SEC (ref 24.3–30.1)
BASOPHILS # BLD AUTO: 0.1 TH/MM3 (ref 0–0.2)
BASOPHILS NFR BLD: 1.1 % (ref 0–2)
BUN SERPL-MCNC: 17 MG/DL (ref 7–18)
CHLORIDE SERPL-SCNC: 106 MEQ/L (ref 98–107)
EOSINOPHIL # BLD: 0.1 TH/MM3 (ref 0–0.4)
EOSINOPHIL NFR BLD: 1.8 % (ref 0–4)
ERYTHROCYTE [DISTWIDTH] IN BLOOD BY AUTOMATED COUNT: 17.5 % (ref 11.6–17.2)
GFR SERPLBLD BASED ON 1.73 SQ M-ARVRAT: 140 ML/MIN (ref 89–?)
HCO3 BLD-SCNC: 30.1 MEQ/L (ref 21–32)
HCT VFR BLD CALC: 37.1 % (ref 35–46)
HEMO FLAGS: (no result)
INR PPP: 1 RATIO
LYMPHOCYTES # BLD AUTO: 1.7 TH/MM3 (ref 1–4.8)
LYMPHOCYTES NFR BLD AUTO: 35.2 % (ref 9–44)
MCH RBC QN AUTO: 22 PG (ref 27–34)
MCHC RBC AUTO-ENTMCNC: 31.5 % (ref 32–36)
MCV RBC AUTO: 70 FL (ref 80–100)
MONOCYTES NFR BLD: 12.1 % (ref 0–8)
NEUTROPHILS # BLD AUTO: 2.4 TH/MM3 (ref 1.8–7.7)
NEUTROPHILS NFR BLD AUTO: 49.8 % (ref 16–70)
PLATELET # BLD: 178 TH/MM3 (ref 150–450)
POTASSIUM SERPL-SCNC: 4.3 MEQ/L (ref 3.5–5.1)
PROTHROMBIN TIME: 11 SEC (ref 9.8–11.6)
RBC # BLD AUTO: 5.3 MIL/MM3 (ref 4–5.3)
SODIUM SERPL-SCNC: 141 MEQ/L (ref 136–145)
WBC # BLD AUTO: 4.9 TH/MM3 (ref 4–11)

## 2017-09-18 PROCEDURE — 49440 PLACE GASTROSTOMY TUBE PERC: CPT

## 2017-09-18 PROCEDURE — 99284 EMERGENCY DEPT VISIT MOD MDM: CPT

## 2017-09-18 PROCEDURE — 96374 THER/PROPH/DIAG INJ IV PUSH: CPT

## 2017-09-18 PROCEDURE — C1769 GUIDE WIRE: HCPCS

## 2017-09-18 PROCEDURE — 85025 COMPLETE CBC W/AUTO DIFF WBC: CPT

## 2017-09-18 PROCEDURE — 80048 BASIC METABOLIC PNL TOTAL CA: CPT

## 2017-09-18 PROCEDURE — 85730 THROMBOPLASTIN TIME PARTIAL: CPT

## 2017-09-18 PROCEDURE — 85610 PROTHROMBIN TIME: CPT

## 2017-09-18 PROCEDURE — 99152 MOD SED SAME PHYS/QHP 5/>YRS: CPT

## 2017-09-18 PROCEDURE — 96361 HYDRATE IV INFUSION ADD-ON: CPT

## 2017-09-18 PROCEDURE — 43760: CPT

## 2017-09-18 PROCEDURE — 71010: CPT

## 2017-09-18 PROCEDURE — 96375 TX/PRO/DX INJ NEW DRUG ADDON: CPT

## 2017-09-18 NOTE — PD
HPI


Chief Complaint:  Medical Device Problem


Time Seen by Provider:  03:48


Travel History


International Travel<30 days:  No


Contact w/Intl Traveler<30days:  No


Traveled to known affect area:  No





History of Present Illness


HPI


The patient is a 72 year old female who presents to the [facility] 

emergency department with a history of accidentally dislodging her feeding tube 

in time prior to arrival.  The patient is aphasic related to a prior history of 

intracranial hemorrhage with a baseline GCS of 10, therefore no history is able 

to be obtained from her.  Unfortunately, no timeframe was provided to the nurse 

on the patient accepted care of the patient from ambulance services.





On the patient's nursing home was called, they estimate that the patient's 

feeding tube came out around midnight.





PFSH


Past Medical History


*** Narrative Medical


The patient's past medical history is significant for a history of arthritis, 

history of intracranial hemorrhage, prior history of cerebrovascular accident, 

dementia, acid reflux, hypertension.


Arthritis:  Yes


Heart Rhythm Problems:  No


Cancer:  No


Cardiovascular Problems:  Yes


High Cholesterol:  No


Chest Pain:  No


Congestive Heart Failure:  No


Cerebrovascular Accident:  Yes


Dementia:  Yes


Endocrine:  No


Gastrointestinal Disorders:  Yes


GERD:  Yes


Genitourinary:  Yes


Hiatal Hernia:  No


Hypertension:  Yes


Immune Disorder:  No


Kidney Stones:  No


Musculoskeletal:  Yes (CONTRACTED)


Neurologic:  Yes


Psychiatric:  Yes


Reproductive:  No


Respiratory:  No


Renal Failure:  Yes


Ulcer:  No


Pregnant?:  Not Pregnant





Past Surgical History


*** Narrative Surgical


The patient's past surgical history is significant for a PEG tube placement for 

feedings, hysterectomy, ear surgery


Abdominal Surgery:  Yes (PEG TUBE)


Cardiac Surgery:  No


Ear Surgery:  Yes


Endocrine Surgery:  No


Eye Surgery:  No


Genitourinary Surgery:  No


Gynecologic Surgery:  Yes (HYSTERECTOMY)


Hysterectomy:  Yes


Neurologic Surgery:  Yes


Oral Surgery:  No


Thoracic Surgery:  No


Other Surgery:  Yes





Social History


Alcohol Use:  No


Tobacco Use:  No


Substance Use:  No





Allergies-Medications


(Allergen,Severity, Reaction):  


Coded Allergies:  


     No Known Allergies (Unverified , 9/18/17)


Reported Meds & Prescriptions





Reported Meds & Active Scripts


Active


Lisinopril 10 Mg Tab 20 Mg PO Q12HR 30 Days


     hold for sbp less 120


Hydrocodone-Acetaminophen 5-325 mg Tab 1 Tab PEG Q4H PRN


Reported


[water-peg]     


[isosource]   40 Ml PEG QHOUR


Keppra (Levetiracetam) 500 Mg Tab 500 Mg PO BID


Zinc Sulfate 220 Mg Tab 220 Mg PEG DAILY


Zantac (Ranitidine HCl) 150 Mg Tab 150 Mg PEG DAILY


Enema Disposable (Sodium Phosphates) 1 Tasha Tasha 1 Applic RECTAL IN THE AM PRN


Tylenol (Acetaminophen) 325 Mg Tab 650 Mg PEG Q4H PRN


Donepezil 5 Mg Tab 5 Mg PEG HS


Ascorbic Acid 500 Mg Tab 500 Mg PEG DAILY


Amlodipine (Amlodipine Besylate) 5 Mg Tab 5 Mg PEG DAILY








Review of Systems


ROS Limitations:  Other: (nonverbal)


Gastrointestinal:  Positive: Other (feeding tube dislodgment)





Physical Exam


Narrative


General: 


The patient is well-developed, well-nourished female, bedbound with 

contractures of her extremities on exam. 





Head and Neck exam: 


Head is normocephalic atraumatic. 


Eyes: Pupils are equal round and reactive to light. 


Nose: Midline septum with pink mucous membranes 


Mouth: Dentition unremarkable. Moist mucus membranes. Posterior oropharynx is 

not erythematous. No tonsillar hypertrophy. Uvula midline. Airway patent. 


Neck: No palpable lymphadenopathy. No nuchal rigidity. No thyromegaly. 





Cardiovascular: 


Regular rate and rhythm without murmurs, gallops, or rubs. 





Lungs: 


Clear to auscultation bilaterally. No wheezes, rhonchi, or rales.


 


Abdomen:


Soft, without tenderness to palpation in all 4 quadrants of the abdomen. No 

guarding, rebound, or rigidity.  Normal bowel sounds are audible.  No 

tenderness on palpation of McBurney's point.  The patient has of bandage in 

place in the left upper quadrant that was removed.  The patient has a dried, 

what appears to be nearly healed stoma under the bandage.





Extremities: 


No clubbing, cyanosis, or edema. 2+ pulses in all 4 extremities.  No calf 

tenderness on palpation.





Neurologic Exam:


She is at her baseline for her neurologic examination with contractures of her 

upper and lower extremities, nonverbal on exam.





Skin Exam: No rash noted. Intact skin that is warm and dry.





Data


Data


Last Documented VS





Vital Signs








  Date Time  Temp Pulse Resp B/P (MAP) Pulse Ox O2 Delivery O2 Flow Rate FiO2


 


9/18/17 03:43 97.9 80 16 176/84 (114)    








Orders





 Orders


Complete Blood Count With Diff (9/18/17 04:23)


Basic Metabolic Panel (Bmp) (9/18/17 04:23)


Prothrombin Time / Inr (Pt) (9/18/17 04:23)


Act Partial Throm Time (Ptt) (9/18/17 04:23)


Chest, Single Ap (9/18/17 04:23)


Iv Access Insert/Monitor (9/18/17 04:23)


Ecg Monitoring (9/18/17 04:23)


Oximetry (9/18/17 04:23)


Sodium Chlor 0.9% 1000 Ml Inj (Ns 1000 M (9/18/17 04:30)


Invasive Rad Dept Consult (9/18/17 )





Labs





Laboratory Tests








Test


  9/18/17


06:15


 


White Blood Count 4.9 TH/MM3 


 


Red Blood Count 5.30 MIL/MM3 


 


Hemoglobin 11.7 GM/DL 


 


Hematocrit 37.1 % 


 


Mean Corpuscular Volume 70.0 FL 


 


Mean Corpuscular Hemoglobin 22.0 PG 


 


Mean Corpuscular Hemoglobin


Concent 31.5 % 


 


 


Red Cell Distribution Width 17.5 % 


 


Platelet Count 178 TH/MM3 


 


Mean Platelet Volume 10.0 FL 


 


Neutrophils (%) (Auto) 49.8 % 


 


Lymphocytes (%) (Auto) 35.2 % 


 


Monocytes (%) (Auto) 12.1 % 


 


Eosinophils (%) (Auto) 1.8 % 


 


Basophils (%) (Auto) 1.1 % 


 


Neutrophils # (Auto) 2.4 TH/MM3 


 


Lymphocytes # (Auto) 1.7 TH/MM3 


 


Monocytes # (Auto) 0.6 TH/MM3 


 


Eosinophils # (Auto) 0.1 TH/MM3 


 


Basophils # (Auto) 0.1 TH/MM3 


 


CBC Comment DIFF FINAL 


 


Differential Comment  


 


Prothrombin Time 11.0 SEC 


 


Prothromb Time International


Ratio 1.0 RATIO 


 


 


Activated Partial


Thromboplast Time 25.6 SEC 


 











MDM


Medical Decision Making


Medical Screen Exam Complete:  Yes


Emergency Medical Condition:  Yes


Medical Record Reviewed:  Yes


Interpretation(s)





Last Impressions








Chest X-Ray 9/18/17 0423 Signed





Impressions: 





 Service Date/Time:  Monday, September 18, 2017 04:57 - CONCLUSION: No acute 





 disease.       David Cruz Jr., MD 








Differential Diagnosis


Accidental versus intentional feeding tube dislodgment


Narrative Course


During the course of the patients emergency department visit, the patients 

history, examination, and differential diagnosis were reviewed with the 

patient.  No information was available in the patient's nursing home record 

regarding the size of her feeding tube.  On exam her stoma appears to be nearly 

closed and very small.  Attempts were made at placement of the Freed catheter 

through the stoma with a 14 and a 16 French catheter without success.  As the 

patient is exclusively dependent on her feeding tube for nutrition and hydration

, the patient will be avoided to the hospital for replacement of her feeding 

tube.  IV access was obtained and blood work was sent for analysis.  From 

reviewing the electronic medical record it was noted that the patient was 

discharged into hospice care at the [facility].  A call 

has been placed out to hospice regarding whether they are aware of whether the 

family would want the patient's feeding tube replaced.  The patient is 

confirmed to be on hospice.  The patient's nurse at hospice reports that the 

family would be the one that would decide whether the patient's feeding tube 

should be replaced.  The contact number for the patient's son was noted to be 

[phone].  I placed a call out to the patient's son, Luis Fernando Obregon.  A 

call went to [employer].  I left a message at 5:10 AM.





The patient was initially provided maintenance IV fluids of normal saline at 70 

mL per hour.





I spoke to Dr. Jageer, the hospitalist on-call regarding this patient's 

admission.  She recommended that the patient not be admitted to the hospital 

and that stated interventional radiology be consulted for feeding tube 

replacement this morning.  An interventional radiology consultation has been 

placed in the record.  The patient's case will be checked out to the oncoming 

emergency physician to disposition the patient after the conclusion of the 

patient's procedure.





Hospice arrived at the patient's bedside and they are agreeable with the plan 

set forth.





Another contact number was provided and I did call the number [phone].  

This phone call went to [facility].  A left a message on the voicemail for a 

family member to call back to the emergency department regarding this patient.  

The patient will be checked out to the oncoming emergency physician to 

disposition the patient based on the conclusion of the patient's evaluation.





Procedures


**Procedure Narrative**


Feeding tube replacement: Attempts were made to replace the patient's dislodged 

feeding tube with a Freed catheter through the stoma.  No catheter was able to 

be passed through the stoma that appears to be nearly closed on evaluation.  

The patient's bandage was replaced.





Physician Communication


Physician Communication


The patient's case was discussed with Dr. Jaeger as mentioned above in the ED 

course





Diagnosis





 Primary Impression:  


 Dislodged gastrostomy tube











Brooke Lopez MD Sep 18, 2017 04:27

## 2017-09-18 NOTE — RADRPT
EXAM DATE/TIME:  09/18/2017 10:28 

 

[facility] COMPARISON:  

GASTROSTOMY TUBE INJECTION, May 05, 2017, 16:47.

                      

 

INDICATIONS :     

Patient with hx of CVA. G tube fell out.

                      

 

MEDICAL HISTORY :           

1. CVA

2. GERD

3. HTN

4. Dysphagia

5. Aphsia

 

SURGICAL HISTORY :     

1. Gtube

2. Ear surgery

3. Lt sided craniotomy

4. hysterectomy

 

ENCOUNTER:           

Initial

 

ACUITY:          

1 day

 

PAIN SCORE:                    

0/10

                      

 

FLUORO TIME:      

1.5 minutes

 

IMAGE SERIES:      

2

 

SEDATION TIME:      

15 minutes

 

CONTRAST:      

15 cc Omnipaque (iohexol) 350

 

 

MEDICATION(S):

1.) 1 mg midazolam (Versed)  IV

2.) 50 mcg Fentanyl (Sublimaze)  IV

 

 

 

DEVICE(S):    

1.) 18 Fr gastrostomy tube 

   

 

PROCEDURE :     

1.  Fluoroscopically guided gastrostomy tube placement.

2.  Conscious sedation with continuous EKG and oximetry monitoring.

 

The risks, benefits and alternatives to the procedure were explained to the patient's daughter. Eliseo
l consent was obtained via telephone.  The site was prepped in sterile fashion.  Full sterile techniq
ue was used, including cap, mask, sterile gloves and gown and a large sterile sheet.  Hand hygiene an
d 2% chlorhexidine and/or betadine/alcohol prep was utilized per protocol for cutaneous antisepsis.  
The skin and subcutaneous tissues were infiltrated with local anesthetic solution.

 

The existing tract on the abdominal wall is identified. The skin around was anesthetized with 10 cc 1
% lidocaine. A 5 Polish dilator was advanced into the tract. Forceful injection of contrast revealed 
a tract down to the stomach. A 0.035 wire was advanced through the dilator. The tract was dilated to 
18 Polish. A 16 Polish G-tube was advanced through the tract and positioned within the stomach withou
t difficulty. Position was confirmed with an injection of contrast.

 

Conscious sedation was performed with the prescribed dosages and duration as above in the presence of
 an independent trained radiology nurse to assist in the monitoring of the patient.  EKG and oximetry
 remained stable throughout the procedure.  The patient tolerated the procedure well and there were n
o complications.  The patient was sent to post anesthesia recovery in stable condition.

 

CONCLUSION:     

Uncomplicated gastrostomy tube placement as above.

 

 

 

 Otoniel Ley MD on September 18, 2017 at 14:36           

Board Certified Radiologist.

 This report was verified electronically.

## 2017-09-18 NOTE — PD.RAD
Post Procedure Progress Note


Pre Procedure Diagnosis:  


(1) Malfunction of gastrostomy tube


Post Procedure Diagnosis:  


(1) Malfunction of gastrostomy tube


Procedure Date:


Sep 18, 2017


Supervising Radiologist:


Otoniel Ley


Estimated blood loss:


2CC


Anesthesia:  Local, Conscious Sedation


Plan of Activity


Patient to Unit:  Other


Patient Condition:  Poor


Additional Comments:


G Tube replaced through the existing tract.


New tube verified to be in good position.


Tube is OK for use.


Full dictated report to follow


See PACS Report for procedural detail/treatment











Otoniel Ley MD Sep 18, 2017 11:18

## 2017-09-18 NOTE — RADRPT
EXAM DATE/TIME:  09/18/2017 04:57 

 

[facility] COMPARISON:     

CHEST SINGLE AP, November 28, 2016, 14:27.

 

                     

INDICATIONS :     

Peg tube accidentally removed.

                     

 

MEDICAL HISTORY :     

Arthritis.  Gastroesophageal reflux disease.  Hypertension.   CVA   

 

SURGICAL HISTORY :     

Hysterectomy.   Peg tube

 

ENCOUNTER:     

Initial                                        

 

ACUITY:     

1 day      

 

PAIN SCORE:     

Non-responsive.

 

LOCATION:     

Bilateral chest 

 

FINDINGS:     

A single view of the chest demonstrates the lungs to be symmetrically aerated without evidence of mas
s, infiltrate or effusion.  The cardiomediastinal contours are unremarkable.  Osseous structures are 
intact.

 

CONCLUSION:     No acute disease.  

 

 

 

 David Cruz Jr., MD on September 18, 2017 at 5:56           

Board Certified Radiologist.

 This report was verified electronically.

## 2018-03-02 ENCOUNTER — HOSPITAL ENCOUNTER (EMERGENCY)
Dept: HOSPITAL 17 - NEPE | Age: 73
Discharge: NURSING, REHAB OR LONG-TERM CARE | End: 2018-03-02
Payer: MEDICARE

## 2018-03-02 VITALS
HEART RATE: 94 BPM | DIASTOLIC BLOOD PRESSURE: 58 MMHG | SYSTOLIC BLOOD PRESSURE: 124 MMHG | RESPIRATION RATE: 16 BRPM | TEMPERATURE: 98.1 F | OXYGEN SATURATION: 96 %

## 2018-03-02 VITALS — DIASTOLIC BLOOD PRESSURE: 58 MMHG | SYSTOLIC BLOOD PRESSURE: 117 MMHG

## 2018-03-02 VITALS — BODY MASS INDEX: 20 KG/M2 | HEIGHT: 59 IN | WEIGHT: 99.21 LBS

## 2018-03-02 VITALS — OXYGEN SATURATION: 96 %

## 2018-03-02 DIAGNOSIS — M19.90: ICD-10-CM

## 2018-03-02 DIAGNOSIS — N39.0: ICD-10-CM
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
## 2018-03-02 DIAGNOSIS — I10: ICD-10-CM

## 2018-03-02 DIAGNOSIS — S00.03XA: Primary | ICD-10-CM

## 2018-03-02 DIAGNOSIS — Z86.73: ICD-10-CM

## 2018-03-02 DIAGNOSIS — T83.511A: ICD-10-CM

## 2018-03-02 DIAGNOSIS — F03.90: ICD-10-CM

## 2018-03-02 LAB
AMORPHOUS SEDIMENT, URINE: (no result)
BACTERIA #/AREA URNS HPF: (no result) /HPF
BASOPHILS # BLD AUTO: 0 TH/MM3 (ref 0–0.2)
BASOPHILS NFR BLD: 0.5 % (ref 0–2)
BUN SERPL-MCNC: 13 MG/DL (ref 7–18)
CALCIUM SERPL-MCNC: 9.5 MG/DL (ref 8.5–10.1)
CHLORIDE SERPL-SCNC: 104 MEQ/L (ref 98–107)
COLOR UR: YELLOW
CREAT SERPL-MCNC: 0.42 MG/DL (ref 0.5–1)
EOSINOPHIL # BLD: 0 TH/MM3 (ref 0–0.4)
EOSINOPHIL NFR BLD: 0.9 % (ref 0–4)
ERYTHROCYTE [DISTWIDTH] IN BLOOD BY AUTOMATED COUNT: 14.8 % (ref 11.6–17.2)
GFR SERPLBLD BASED ON 1.73 SQ M-ARVRAT: 179 ML/MIN (ref 89–?)
GLUCOSE SERPL-MCNC: 103 MG/DL (ref 74–106)
GLUCOSE UR STRIP-MCNC: (no result) MG/DL
HCO3 BLD-SCNC: 24.4 MEQ/L (ref 21–32)
HCT VFR BLD CALC: 40.4 % (ref 35–46)
HGB BLD-MCNC: 12.9 GM/DL (ref 11.6–15.3)
HGB UR QL STRIP: (no result)
KETONES UR STRIP-MCNC: (no result) MG/DL
LYMPHOCYTES # BLD AUTO: 0.9 TH/MM3 (ref 1–4.8)
LYMPHOCYTES NFR BLD AUTO: 18.9 % (ref 9–44)
MCH RBC QN AUTO: 22.9 PG (ref 27–34)
MCHC RBC AUTO-ENTMCNC: 31.9 % (ref 32–36)
MCV RBC AUTO: 71.7 FL (ref 80–100)
MONOCYTE #: 0.4 TH/MM3 (ref 0–0.9)
MONOCYTES NFR BLD: 8.2 % (ref 0–8)
MUCOUS THREADS #/AREA URNS LPF: (no result) /LPF
NEUTROPHILS # BLD AUTO: 3.6 TH/MM3 (ref 1.8–7.7)
NEUTROPHILS NFR BLD AUTO: 71.5 % (ref 16–70)
NITRITE UR QL STRIP: (no result)
PLATELET # BLD: 253 TH/MM3 (ref 150–450)
PMV BLD AUTO: 10.4 FL (ref 7–11)
RBC # BLD AUTO: 5.63 MIL/MM3 (ref 4–5.3)
SODIUM SERPL-SCNC: 138 MEQ/L (ref 136–145)
SP GR UR STRIP: 1.01 (ref 1–1.03)
SQUAMOUS #/AREA URNS HPF: 2 /HPF (ref 0–5)
URINE LEUKOCYTE ESTERASE: (no result)
WBC # BLD AUTO: 5 TH/MM3 (ref 4–11)

## 2018-03-02 PROCEDURE — 81001 URINALYSIS AUTO W/SCOPE: CPT

## 2018-03-02 PROCEDURE — 87086 URINE CULTURE/COLONY COUNT: CPT

## 2018-03-02 PROCEDURE — 70450 CT HEAD/BRAIN W/O DYE: CPT

## 2018-03-02 PROCEDURE — 83605 ASSAY OF LACTIC ACID: CPT

## 2018-03-02 PROCEDURE — 72125 CT NECK SPINE W/O DYE: CPT

## 2018-03-02 PROCEDURE — 80048 BASIC METABOLIC PNL TOTAL CA: CPT

## 2018-03-02 PROCEDURE — 96360 HYDRATION IV INFUSION INIT: CPT

## 2018-03-02 PROCEDURE — 85025 COMPLETE CBC W/AUTO DIFF WBC: CPT

## 2018-03-02 PROCEDURE — 99284 EMERGENCY DEPT VISIT MOD MDM: CPT

## 2018-03-02 NOTE — PD
HPI


Chief Complaint:  Fall


Time Seen by Provider:  11:04


Travel History


International Travel<30 days:  No


Contact w/Intl Traveler<30days:  No


Traveled to known affect area:  No





History of Present Illness


HPI


72-year-old nonverbal female with PMH of CVA, HTN arrives to the ED via EMS 

from her care home after unwitnessed fall.  Workers at the left states that they saw 

her in bed 20 minutes before finding her on the floor with a hematoma on the 

right forehead.  On exam the patient is nonverbal, she does not respond to 

questioning, she does not follow commands.  She is unable to provide any 

history.





PFSH


Past Medical History


Arthritis:  Yes


Heart Rhythm Problems:  No


Cancer:  No


Cardiovascular Problems:  Yes


High Cholesterol:  No


Chest Pain:  No


Congestive Heart Failure:  No


Cerebrovascular Accident:  Yes


Dementia:  Yes


Endocrine:  No


Gastrointestinal Disorders:  Yes


GERD:  Yes


Genitourinary:  Yes


Hiatal Hernia:  No


Hypertension:  Yes


Immune Disorder:  No


Kidney Stones:  No


Musculoskeletal:  Yes (CONTRACTED)


Neurologic:  Yes


Psychiatric:  Yes


Reproductive:  No


Respiratory:  No


Renal Failure:  Yes


Ulcer:  No


Tetanus Vaccination:  Unknown


Influenza Vaccination:  No


Pregnant?:  Not Pregnant


Menopausal:  Yes





Past Surgical History


Abdominal Surgery:  Yes (PEG TUBE)


Cardiac Surgery:  No


Ear Surgery:  Yes


Endocrine Surgery:  No


Eye Surgery:  No


Genitourinary Surgery:  No


Gynecologic Surgery:  Yes (HYSTERECTOMY)


Hysterectomy:  Yes


Neurologic Surgery:  Yes


Oral Surgery:  No


Thoracic Surgery:  No


Other Surgery:  Yes





Social History


Alcohol Use:  No


Tobacco Use:  No


Substance Use:  No





Allergies-Medications


(Allergen,Severity, Reaction):  


Coded Allergies:  


     No Known Allergies (Unverified  Adverse Reaction, Unknown, 3/2/18)


Reported Meds & Prescriptions





Reported Meds & Active Scripts


Active


Cipro Liq (Ciprofloxacin) 500 Mg/5 Ml Susp 500 Mg PO BID 7 Days


     Do not administer within 2 hours of zinc administration


Hydrocodone-Acetaminophen 5-325 mg Tab 1 Tab PEG Q4H PRN


Reported


Ditropan (Oxybutynin Chloride) 5 Mg Tab 5 Mg PEG Q12HR


Multi-Vitamin/Minerals (Multiple Vitamins W/ Minerals) 1 Tab Tab 1 Tab PO DAILY


Milk of Magnesia Liq (Magnesium Hydroxide) 400 Mg/5 Ml Susp 30 Ml PEG HS PRN


Lisinopril 20 Mg Tab 20 Mg PEG BID


Dulcolax Supp (Bisacodyl) 10 Mg Supp 10 Mg RECTAL DAILY PRN


Amlodipine (Amlodipine Besylate) 10 Mg Tab 10 Mg G-TUBE DAILY


Keppra (Levetiracetam) 500 Mg Tab 500 Mg PO BID


Zinc Sulfate 220 Mg Tab 220 Mg PEG DAILY


Zantac (Ranitidine HCl) 150 Mg Tab 150 Mg PEG DAILY


Enema Disposable (Sodium Phosphates) 1 Tasha Tasha 1 Applic RECTAL IN THE AM PRN


Tylenol (Acetaminophen) 325 Mg Tab 650 Mg PEG Q4H PRN


Ascorbic Acid 500 Mg Tab 500 Mg G-TUBE BID








Review of Systems


Except as stated in HPI:  all other systems reviewed are Neg





Physical Exam


Narrative


GENERAL: Well-nourished, -American female in no acute distress.


SKIN: Focused skin assessment warm/dry.  Hematoma over the right temporal.


HEAD: Normocephalic. Hematoma right frontal region.  No tenderness to palpation 

of the skull or facial bones.  No bony step-offs.


EYES: No scleral icterus. No injection or drainage.  PERRLA.


NECK: Supple, trachea midline. No JVD or lymphadenopathy.  No tenderness to 

palpation of the midline.


CARDIOVASCULAR: Regular rate and rhythm without murmurs, gallops, or rubs. 


RESPIRATORY: Breath sounds clear and equal bilaterally. No accessory muscle use.


GASTROINTESTINAL: Abdomen soft, non-tender, nondistended.  Active bowel sounds.

  Feeding tube in place, without signs of infection.


: Freed catheter in place with cloudy yellow urine in the collecting bag.


MUSCULOSKELETAL: No cyanosis, or edema.  Contractures of bilateral upper and 

lower extremities.  No tenderness to palpation over the joint lines of the 

upper and lower extremities bilaterally.


BACK: Nontender.Without obvious deformity. No CVA tenderness.





Data


Data


Last Documented VS





Vital Signs








  Date Time  Temp Pulse Resp B/P (MAP) Pulse Ox O2 Delivery O2 Flow Rate FiO2


 


3/2/18 11:19     96 Room Air  


 


3/2/18 10:58 98.1 94 16 124/58 (80)    








Orders





 Orders


Ct Brain W/O Iv Contrast(Rout) (3/2/18 11:04)


Ct Cerv Spine W/O Contrast (3/2/18 11:04)


Basic Metabolic Panel (Bmp) (3/2/18 11:04)


Complete Blood Count With Diff (3/2/18 11:04)


Urinalysis - C+S If Indicated (3/2/18 11:04)


Iv Access Insert/Monitor (3/2/18 11:04)


Ecg Monitoring (3/2/18 11:04)


Oximetry (3/2/18 11:04)


Sodium Chlor 0.9% 1000 Ml Inj (Ns 1000 M (3/2/18 11:04)


Sodium Chloride 0.9% Flush (Ns Flush) (3/2/18 11:15)


Lactic Acid (3/2/18 11:04)


Urine Culture (3/2/18 11:10)


Ed Discharge Order (3/2/18 12:24)





Labs





Laboratory Tests








Test


  3/2/18


11:10


 


White Blood Count 5.0 TH/MM3 


 


Red Blood Count 5.63 MIL/MM3 


 


Hemoglobin 12.9 GM/DL 


 


Hematocrit 40.4 % 


 


Mean Corpuscular Volume 71.7 FL 


 


Mean Corpuscular Hemoglobin 22.9 PG 


 


Mean Corpuscular Hemoglobin


Concent 31.9 % 


 


 


Red Cell Distribution Width 14.8 % 


 


Platelet Count 253 TH/MM3 


 


Mean Platelet Volume 10.4 FL 


 


Neutrophils (%) (Auto) 71.5 % 


 


Lymphocytes (%) (Auto) 18.9 % 


 


Monocytes (%) (Auto) 8.2 % 


 


Eosinophils (%) (Auto) 0.9 % 


 


Basophils (%) (Auto) 0.5 % 


 


Neutrophils # (Auto) 3.6 TH/MM3 


 


Lymphocytes # (Auto) 0.9 TH/MM3 


 


Monocytes # (Auto) 0.4 TH/MM3 


 


Eosinophils # (Auto) 0.0 TH/MM3 


 


Basophils # (Auto) 0.0 TH/MM3 


 


CBC Comment DIFF FINAL 


 


Differential Comment  


 


Urine Color YELLOW 


 


Urine Turbidity CLOUDY 


 


Urine pH 8.0 


 


Urine Specific Gravity 1.009 


 


Urine Protein TRACE mg/dL 


 


Urine Glucose (UA) NEG mg/dL 


 


Urine Ketones NEG mg/dL 


 


Urine Occult Blood NEG 


 


Urine Nitrite POS 


 


Urine Bilirubin NEG 


 


Urine Urobilinogen


  LESS THAN 2.0


MG/DL


 


Urine Leukocyte Esterase LARGE 


 


Urine RBC 4 /hpf 


 


Urine WBC 6 /hpf 


 


Urine Squamous Epithelial


Cells 2 /hpf 


 


 


Urine Amorphous Sediment MANY 


 


Urine Bacteria OCC /hpf 


 


Urine Mucus FEW /lpf 


 


Microscopic Urinalysis Comment


  CULTURE


INDICATED


 


Blood Urea Nitrogen 13 MG/DL 


 


Creatinine 0.42 MG/DL 


 


Random Glucose 103 MG/DL 


 


Calcium Level 9.5 MG/DL 


 


Sodium Level 138 MEQ/L 


 


Potassium Level 4.6 MEQ/L 


 


Chloride Level 104 MEQ/L 


 


Carbon Dioxide Level 24.4 MEQ/L 


 


Anion Gap 10 MEQ/L 


 


Estimat Glomerular Filtration


Rate 179 ML/MIN 


 


 


Lactic Acid Level 1.1 mmol/L 











MDM


Medical Decision Making


Medical Screen Exam Complete:  Yes


Emergency Medical Condition:  Yes


Differential Diagnosis


contusion versus hematoma versus ICH versus metabolic derangement versus 

dehydration versus UTI versus other


Narrative Course


72-year-old nonverbal female with PMH of CVA, HTN arrives to the ED via EMS 

from her [facility] after unwitnessed fall.  Workers at the left states that they saw 

her in bed 20 minutes before finding her on the floor with a hematoma on the 

right forehead.  On exam the patient is nonverbal, she does not respond to 

questioning, she does not follow commands.  She is unable to provide any 

history. Patient is afebrile, bp 124/58 on presentation. On exam the patient is 

alert, looking about the room. I am unable to elicit any response to palpation 

of the skull bones, midline cervical spine, joints of the upper and lower 

extremities bilaterally. Both upper and lower extremities are contractured.  

Abdomen is soft and nontender.  G-tube in place.  There is a Freed catheter in 

place with cloudy yellow urine in the collection bag.  IV was established.





CT brain: Marked atrophy, dilatation left lateral ventricle with previous 

surgery on the left.  Negative for acute process per radiology read.


Cervical CT: No acute fracture or subluxation.  Degenerative spondylosis of the 

cervical spine most prominent at C3 4, C5 6 and C6 7.


UA: Yellow, cloudy, nitrite positive, large leukocyte esterase, 6 WBCs, 

occasional bacteria.  Culture indicated


3/2/18 11:10








Calcium Level 9.5





On recheck there is a representative of the [facility] at bedside.  She states that 

the patient was alone for 10 minutes when she was found on the floor, face 

down.  She states the patient is behaving at baseline.  I discussed the results 

of the workup with her.  I prescribed a course of Cipro 500 mg twice a day 7 

days to treat the UTI.  Patient is stable and discharged back to the care home.





Diagnosis





 Primary Impression:  


 Fall


 Qualified Codes:  W19.XXXA - Unspecified fall, initial encounter


 Additional Impressions:  


 Traumatic hematoma of scalp


 Qualified Codes:  S00.03XA - Contusion of scalp, initial encounter


 Urinary tract infection associated with indwelling urethral catheter


 Qualified Codes:  T83.511A - Infection and inflammatory reaction due to 

indwelling urethral catheter, initial encounter; N39.0 - Urinary tract infection

, site not specified


Referrals:  


Primary Care Physician


Patient Instructions:  Catheter-associated Urinary Tract Infection (ED), 

General Instructions





***Additional Instructions:  


Do not administer Cipro within 2 hours of Zinc administration.


***Med/Other Pt SpecificInfo:  Prescription(s) given


Scripts


Ciprofloxacin Liq (Cipro Liq) 500 Mg/5 Ml Susp


500 MG PO BID for Infection for 7 Days, #70 ML 0 Refills


   Do not administer within 2 hours of zinc administration


   Prov: Jazz Gonzalez MD         3/2/18


Disposition:  03 DISCHARGE TO SNF


Condition:  Stable











Jerica Sellers Mar 2, 2018 11:10

## 2018-03-02 NOTE — RADRPT
EXAM DATE/TIME:  03/02/2018 11:27 

 

[facility] COMPARISON:     

CT BRAIN W/O CONTRAST, August 25, 2017, 16:52.

 

 

INDICATIONS :     

Fall from bed.  Right forehead hematoma. 

                      

 

RADIATION DOSE:     

56.35 CTDIvol (mGy) 

 

 

 

MEDICAL HISTORY :     

Hypertension. Dementia. Cerebrovascular disease.Renal failure

 

SURGICAL HISTORY :      

Hysterectomy. 

 

ENCOUNTER:      

Initial

 

ACUITY:      

1 day

 

PAIN SCALE:      

Non-responsive

 

LOCATION:        

cranial 

 

TECHNIQUE:     

Multiple contiguous axial images were obtained of the head.  Using automated exposure control and adj
ustment of the mA and/or kV according to patient size, radiation dose was kept as low as reasonably a
chievable to obtain optimal diagnostic quality images.   DICOM format image data is available electro
nically for review and comparison.  

 

FINDINGS:     

Moderate motion artifact is present.  Evidence for previous surgery is seen on the left.  Porencephal
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

Marked central and cortical atrophy

There is no parenchymal hemorrhage.  No extra-axial fluid collections appreciated

Posterior fossa shows atrophy but otherwise negative.

 

CONCLUSION:

\

Marked atrophy.

Dilatation left lateral ventricle with previous surgery on the left.

 

Negative for acute process.  

 

 

 Nacho Ley MD FACR on March 02, 2018 at 11:41           

Board Certified Radiologist.

 This report was verified electronically.

## 2018-03-02 NOTE — RADRPT
EXAM DATE/TIME:  03/02/2018 11:27 

 

[facility] COMPARISON:     

CT CERVICAL SPINE W/O CONTRAST, May 21, 2017, 19:35.

 

 

INDICATIONS :     

Fall from bed.  Right forehead hematoma. 

                      

 

RADIATION DOSE:     

34.98 CTDIvol (mGy) 

 

 

 

MEDICAL HISTORY :     

Hypertension. Dementia. Cerebrovascular disease.Renal failure

 

SURGICAL HISTORY :      

Hysterectomy. 

 

ENCOUNTER:      

Initial

 

ACUITY:      

1 day

 

PAIN SCALE:      

Non-responsive

 

LOCATION:        

neck 

 

TECHNIQUE:     

Volumetric scanning of the cervical spine was performed. Multiplanar reconstructions in the sagittal,
 coronal and oblique axial planes were performed.   Using automated exposure control and adjustment o
f the mA and/or kV according to patient size, radiation dose was kept as low as reasonably achievable
 to obtain optimal diagnostic quality images.   DICOM format image data is available electronically f
or review and comparison.  

 

FINDINGS:     

Vertebral body heights are maintained. Osseous structures are intact without evidence for acute bony 
fracture. Dens is intact. There is stable subtle 2 mm anterolisthesis of C4 on C5. Degenerative kypho
sis centered at C5-6 stable from prior exam. There is a normal C1-2 relationship. Facets are normally
 aligned. There is no significant prevertebral soft tissue hematoma. Multilevel degenerative spondylo
sis with disc space narrowing and osteophyte formation most prominently at C5-6 and C6-7. There is pe
rsistent posterior disc protrusion at C3-4. Bony central canal is patent. Multilevel facet arthropath
y. No significant cervical adenopathy or gross mass. The thyroid appears unremarkable. Visualized trell
g apices are clear without pneumothorax.

 

 

CONCLUSION:     

1. No acute fracture or subluxation.

2. Degenerative spondylosis of the cervical spine most prominently at C3-4, C5-6 and C6-7. 

 

 

 Ernesto Turner MD on March 02, 2018 at 12:02           

Board Certified Radiologist.

 This report was verified electronically.

## 2018-03-02 NOTE — PD
Physical Exam


Date Seen by Provider:  Mar 2, 2018


Narrative


Patient is being evaluated for a fall with apparent injury to her head.  She 

was found on the floor at a nursing home with a contusion to her forehead.  The 

circumstances of the fall are unknown.  The patient suffers from dementia.





Data


Data


Last Documented VS





Vital Signs








  Date Time  Temp Pulse Resp B/P (MAP) Pulse Ox O2 Delivery O2 Flow Rate FiO2


 


3/2/18 11:19     96 Room Air  


 


3/2/18 10:58 98.1 94 16 124/58 (80)    








Orders





 Orders


Ct Brain W/O Iv Contrast(Rout) (3/2/18 11:04)


Ct Cerv Spine W/O Contrast (3/2/18 11:04)


Basic Metabolic Panel (Bmp) (3/2/18 11:04)


Complete Blood Count With Diff (3/2/18 11:04)


Urinalysis - C+S If Indicated (3/2/18 11:04)


Iv Access Insert/Monitor (3/2/18 11:04)


Ecg Monitoring (3/2/18 11:04)


Oximetry (3/2/18 11:04)


Sodium Chlor 0.9% 1000 Ml Inj (Ns 1000 M (3/2/18 11:04)


Sodium Chloride 0.9% Flush (Ns Flush) (3/2/18 11:15)


Lactic Acid (3/2/18 11:04)





Labs





Laboratory Tests








Test


  3/2/18


11:10


 


White Blood Count 5.0 TH/MM3 


 


Red Blood Count 5.63 MIL/MM3 


 


Hemoglobin 12.9 GM/DL 


 


Hematocrit 40.4 % 


 


Mean Corpuscular Volume 71.7 FL 


 


Mean Corpuscular Hemoglobin 22.9 PG 


 


Mean Corpuscular Hemoglobin


Concent 31.9 % 


 


 


Red Cell Distribution Width 14.8 % 


 


Platelet Count 253 TH/MM3 


 


Mean Platelet Volume 10.4 FL 


 


Neutrophils (%) (Auto) 71.5 % 


 


Lymphocytes (%) (Auto) 18.9 % 


 


Monocytes (%) (Auto) 8.2 % 


 


Eosinophils (%) (Auto) 0.9 % 


 


Basophils (%) (Auto) 0.5 % 


 


Neutrophils # (Auto) 3.6 TH/MM3 


 


Lymphocytes # (Auto) 0.9 TH/MM3 


 


Monocytes # (Auto) 0.4 TH/MM3 


 


Eosinophils # (Auto) 0.0 TH/MM3 


 


Basophils # (Auto) 0.0 TH/MM3 


 


CBC Comment DIFF FINAL 


 


Differential Comment  


 


Blood Urea Nitrogen 13 MG/DL 


 


Creatinine 0.42 MG/DL 


 


Random Glucose 103 MG/DL 


 


Calcium Level 9.5 MG/DL 


 


Sodium Level 138 MEQ/L 


 


Potassium Level 4.6 MEQ/L 


 


Chloride Level 104 MEQ/L 


 


Carbon Dioxide Level 24.4 MEQ/L 


 


Anion Gap 10 MEQ/L 


 


Estimat Glomerular Filtration


Rate 179 ML/MIN 


 


 


Lactic Acid Level 1.1 mmol/L 











MDM


Supervised Visit with [facility]:  Yes


Narrative Course


I, Dr. Gonzalez, have reviewed the advance practice practitioner's documentation 

and am in agreement, met with the patient face to face, made the diagnosis, and 

the medical decision making was done by me.  





*My assessment and Findings: Patient is nonverbal.  She does not appear to be 

in any acute distress.  She has a contusion to the right forehead.





Please see Jerica Sellers PA-C's  note for results of laboratory and 

radiographic evaluation, ED course, final diagnosis and disposition











Jazz Gonzalez MD Mar 2, 2018 11:59

## 2018-03-29 ENCOUNTER — HOSPITAL ENCOUNTER (EMERGENCY)
Dept: HOSPITAL 17 - NEDAMB | Age: 73
Discharge: NURSING, REHAB OR LONG-TERM CARE | End: 2018-03-29
Payer: MEDICARE

## 2018-03-29 VITALS
SYSTOLIC BLOOD PRESSURE: 157 MMHG | HEART RATE: 100 BPM | DIASTOLIC BLOOD PRESSURE: 74 MMHG | RESPIRATION RATE: 18 BRPM | TEMPERATURE: 98.1 F | OXYGEN SATURATION: 98 %

## 2018-03-29 VITALS — SYSTOLIC BLOOD PRESSURE: 152 MMHG | DIASTOLIC BLOOD PRESSURE: 74 MMHG

## 2018-03-29 DIAGNOSIS — R00.0: ICD-10-CM

## 2018-03-29 DIAGNOSIS — N18.9: ICD-10-CM

## 2018-03-29 DIAGNOSIS — K21.9: ICD-10-CM

## 2018-03-29 DIAGNOSIS — F03.90: ICD-10-CM

## 2018-03-29 DIAGNOSIS — Z79.899: ICD-10-CM

## 2018-03-29 DIAGNOSIS — I12.9: ICD-10-CM

## 2018-03-29 DIAGNOSIS — K94.29: Primary | ICD-10-CM

## 2018-03-29 DIAGNOSIS — I69.951: ICD-10-CM

## 2018-03-29 DIAGNOSIS — M19.90: ICD-10-CM

## 2018-03-29 LAB
BASOPHILS # BLD AUTO: 0 TH/MM3 (ref 0–0.2)
BASOPHILS NFR BLD: 1 % (ref 0–2)
BUN SERPL-MCNC: 12 MG/DL (ref 7–18)
CALCIUM SERPL-MCNC: 9.5 MG/DL (ref 8.5–10.1)
CHLORIDE SERPL-SCNC: 107 MEQ/L (ref 98–107)
CREAT SERPL-MCNC: 0.4 MG/DL (ref 0.5–1)
EOSINOPHIL # BLD: 0.1 TH/MM3 (ref 0–0.4)
EOSINOPHIL NFR BLD: 1.4 % (ref 0–4)
ERYTHROCYTE [DISTWIDTH] IN BLOOD BY AUTOMATED COUNT: 15.2 % (ref 11.6–17.2)
GFR SERPLBLD BASED ON 1.73 SQ M-ARVRAT: 189 ML/MIN (ref 89–?)
GLUCOSE SERPL-MCNC: 102 MG/DL (ref 74–106)
HCO3 BLD-SCNC: 26.1 MEQ/L (ref 21–32)
HCT VFR BLD CALC: 37.7 % (ref 35–46)
HGB BLD-MCNC: 11.7 GM/DL (ref 11.6–15.3)
INR PPP: 1.1 RATIO
LYMPHOCYTES # BLD AUTO: 1.2 TH/MM3 (ref 1–4.8)
LYMPHOCYTES NFR BLD AUTO: 24 % (ref 9–44)
MCH RBC QN AUTO: 22 PG (ref 27–34)
MCHC RBC AUTO-ENTMCNC: 31.1 % (ref 32–36)
MCV RBC AUTO: 70.8 FL (ref 80–100)
MONOCYTE #: 0.6 TH/MM3 (ref 0–0.9)
MONOCYTES NFR BLD: 11.5 % (ref 0–8)
NEUTROPHILS # BLD AUTO: 3 TH/MM3 (ref 1.8–7.7)
NEUTROPHILS NFR BLD AUTO: 62.1 % (ref 16–70)
PLATELET # BLD: 284 TH/MM3 (ref 150–450)
PMV BLD AUTO: 9.9 FL (ref 7–11)
PROTHROMBIN TIME: 10.7 SEC (ref 9.8–11.6)
RBC # BLD AUTO: 5.33 MIL/MM3 (ref 4–5.3)
SODIUM SERPL-SCNC: 139 MEQ/L (ref 136–145)
WBC # BLD AUTO: 4.9 TH/MM3 (ref 4–11)

## 2018-03-29 PROCEDURE — 80048 BASIC METABOLIC PNL TOTAL CA: CPT

## 2018-03-29 PROCEDURE — 49440 PLACE GASTROSTOMY TUBE PERC: CPT

## 2018-03-29 PROCEDURE — 85610 PROTHROMBIN TIME: CPT

## 2018-03-29 PROCEDURE — 85730 THROMBOPLASTIN TIME PARTIAL: CPT

## 2018-03-29 PROCEDURE — 85025 COMPLETE CBC W/AUTO DIFF WBC: CPT

## 2018-03-29 PROCEDURE — 99284 EMERGENCY DEPT VISIT MOD MDM: CPT

## 2018-03-29 PROCEDURE — C1769 GUIDE WIRE: HCPCS

## 2018-03-29 NOTE — RADRPT
EXAM DATE/TIME:  03/29/2018 14:18 

 

[facility] COMPARISON:  

GASTROSTOMY TUBE PLACEMENT, September 18, 2017, 10:28.

                      

 

INDICATIONS :     

Patient with history of CVA. Patients G tube fell o ut.

                      

 

MEDICAL HISTORY :           

1. CVA

2. GERD

3. HTN

4. Dyspahgia

5. Aphsia

 

SURGICAL HISTORY :     

1. G tube

2. ear surgery

3. Lt sided craniotomy

4. Hysterectomy

 

ENCOUNTER:           

Initial

 

ACUITY:          

1 week

 

PAIN SCORE:                    

0/10

                      

 

FLUORO TIME:      

1.0 minutes

 

IMAGE SERIES:      

2

                     

 

CONTRAST:      

10 cc Omnipaque (iohexol) 350

 

 

 

 

DEVICE(S):    

1.) 18 Fr gastrostomy tube 

   

 

PROCEDURE :     

1.  Fluoroscopically guided gastrostomy tube placement.

2.  Conscious sedation with continuous EKG and oximetry monitoring.

 

The risks, benefits and alternatives to the procedure were explained and verbal and written consent w
as obtained.  The site was prepped in sterile fashion.  Full sterile technique was used, including ca
p, mask, sterile gloves and gown and a large sterile sheet.  Hand hygiene and 2% chlorhexidine and/or
 betadine/alcohol prep was utilized per protocol for cutaneous antisepsis.  The skin and subcutaneous
 tissues were infiltrated with local anesthetic solution.

 

The stomach was accessed with a 6 [place] dilator An 0.035 wire was advanced into the small bowel.  Th
e tract was dilated.  The gastrostomy tube was introduced through a peel-away sheath. The position wa
s confirmed with an injection of contrast.

 

Conscious sedation was performed with the prescribed dosages and duration as above in the presence of
 an independent trained radiology nurse to assist in the monitoring of the patient.  EKG and oximetry
 remained stable throughout the procedure.  The patient tolerated the procedure well and there were n
o complications.  The patient was sent to post anesthesia recovery in stable condition.

 

CONCLUSION:     Uncomplicated gastrostomy tube placement as above.

 

 

 

 Raúl Barahona MD on March 29, 2018 at 14:52           

Board Certified Radiologist.

 This report was verified electronically.

## 2018-03-29 NOTE — PD
HPI


Chief Complaint:  GI Complaint


Time Seen by Provider:  12:45


Travel History


International Travel<30 days:  No


Contact w/Intl Traveler<30days:  No


Traveled to known affect area:  No





History of Present Illness


HPI


73-year-old female history of dementia, CVA with right-sided deficit, CKD, 

hypertension, presents emergency department for evaluation after her PEG tube 

was dislodged at her skilled nursing facility.  Patient is unable to provide 

any history.  The worker who accompanies her, tells me she does not know when 

it was dislodged but they noticed it today.  Patient is otherwise been well and 

acting at her baseline.





PFSH


Past Medical History


Arthritis:  Yes


Heart Rhythm Problems:  No


Cancer:  No


Cardiovascular Problems:  Yes


High Cholesterol:  No


Chest Pain:  No


Congestive Heart Failure:  No


Cerebrovascular Accident:  Yes


Dementia:  Yes


Endocrine:  No


Gastrointestinal Disorders:  Yes


GERD:  Yes


Genitourinary:  Yes


Hiatal Hernia:  No


Hypertension:  Yes


Immune Disorder:  No


Kidney Stones:  No


Musculoskeletal:  Yes (CONTRACTED)


Neurologic:  Yes


Psychiatric:  Yes


Reproductive:  No


Respiratory:  No


Renal Failure:  Yes


Ulcer:  No


Menopausal:  Yes





Past Surgical History


Abdominal Surgery:  Yes (PEG TUBE)


Cardiac Surgery:  No


Ear Surgery:  Yes


Endocrine Surgery:  No


Eye Surgery:  No


Genitourinary Surgery:  No


Gynecologic Surgery:  Yes (HYSTERECTOMY)


Hysterectomy:  Yes


Neurologic Surgery:  Yes


Oral Surgery:  No


Thoracic Surgery:  No


Other Surgery:  Yes





Social History


Alcohol Use:  No


Tobacco Use:  No


Substance Use:  No





Allergies-Medications


(Allergen,Severity, Reaction):  


Coded Allergies:  


     No Known Allergies (Unverified  Adverse Reaction, Unknown, 3/2/18)


Reported Meds & Prescriptions





Reported Meds & Active Scripts


Active


Cipro Liq (Ciprofloxacin) 500 Mg/5 Ml Susp 500 Mg PO BID 7 Days


     Do not administer within 2 hours of zinc administration


Hydrocodone-Acetaminophen 5-325 mg Tab 1 Tab PEG Q4H PRN


Reported


Ditropan (Oxybutynin Chloride) 5 Mg Tab 5 Mg PEG Q12HR


Multi-Vitamin/Minerals (Multiple Vitamins W/ Minerals) 1 Tab Tab 1 Tab PO DAILY


Milk of Magnesia Liq (Magnesium Hydroxide) 400 Mg/5 Ml Susp 30 Ml PEG HS PRN


Lisinopril 20 Mg Tab 20 Mg PEG BID


Dulcolax Supp (Bisacodyl) 10 Mg Supp 10 Mg RECTAL DAILY PRN


Amlodipine (Amlodipine Besylate) 10 Mg Tab 10 Mg G-TUBE DAILY


Keppra (Levetiracetam) 500 Mg Tab 500 Mg PO BID


Zinc Sulfate 220 Mg Tab 220 Mg PEG DAILY


Zantac (Ranitidine HCl) 150 Mg Tab 150 Mg PEG DAILY


Enema Disposable (Sodium Phosphates) 1 Tasha Tasha 1 Applic RECTAL IN THE AM PRN


Tylenol (Acetaminophen) 325 Mg Tab 650 Mg PEG Q4H PRN


Ascorbic Acid 500 Mg Tab 500 Mg G-TUBE BID








Review of Systems


Except as stated in HPI:  all other systems reviewed are Neg





Physical Exam


Narrative


GENERAL: Well-nourished female patient, lying in bed, appears without distress.


SKIN: Focused skin assessment warm/dry.


HEAD: Atraumatic. Normocephalic. 


EYES: Pupils equal and round. No scleral icterus. No injection or drainage. 


ENT: No nasal bleeding or discharge.  Mucous membranes pink and moist.


NECK: Trachea midline. No JVD. 


CARDIOVASCULAR: Elevated rate and rhythm.  


RESPIRATORY: No accessory muscle use. Clear to auscultation. Breath sounds 

equal bilaterally. 


GASTROINTESTINAL: Abdomen soft, non-tender, nondistended. Hepatic and splenic 

margins not palpable.  Left upper quadrant opening where PEG tube once was.  No 

significant erythema or edema.  No active drainage.


MUSCULOSKELETAL: No obvious deformities. No clubbing.  No cyanosis.  No edema. 


NEUROLOGICAL: Awake and alert.  Unable to assess cranial nerves.  Patient has 

contracted right upper extremity.  She is nonverbal.  She does move her eyes.





Data


Data


Last Documented VS





Vital Signs








  Date Time  Temp Pulse Resp B/P (MAP) Pulse Ox O2 Delivery O2 Flow Rate FiO2


 


3/29/18 12:22 98.1 100 18 157/74 (101) 98   








Orders





 Orders


Complete Blood Count With Diff (3/29/18 12:48)


Basic Metabolic Panel (Bmp) (3/29/18 12:48)


Coag Profile (3/29/18 12:48)


Iv Access Insert/Monitor (3/29/18 12:48)


Gastrostomy Tube Placement (3/29/18 )


Iohexol 350 Inj (Omnipaque 350 Inj) (3/29/18 14:37)





Labs





Laboratory Tests








Test


  3/29/18


12:50


 


White Blood Count 4.9 TH/MM3 


 


Red Blood Count 5.33 MIL/MM3 


 


Hemoglobin 11.7 GM/DL 


 


Hematocrit 37.7 % 


 


Mean Corpuscular Volume 70.8 FL 


 


Mean Corpuscular Hemoglobin 22.0 PG 


 


Mean Corpuscular Hemoglobin


Concent 31.1 % 


 


 


Red Cell Distribution Width 15.2 % 


 


Platelet Count 284 TH/MM3 


 


Mean Platelet Volume 9.9 FL 


 


Neutrophils (%) (Auto) 62.1 % 


 


Lymphocytes (%) (Auto) 24.0 % 


 


Monocytes (%) (Auto) 11.5 % 


 


Eosinophils (%) (Auto) 1.4 % 


 


Basophils (%) (Auto) 1.0 % 


 


Neutrophils # (Auto) 3.0 TH/MM3 


 


Lymphocytes # (Auto) 1.2 TH/MM3 


 


Monocytes # (Auto) 0.6 TH/MM3 


 


Eosinophils # (Auto) 0.1 TH/MM3 


 


Basophils # (Auto) 0.0 TH/MM3 


 


CBC Comment DIFF FINAL 


 


Differential Comment  


 


Prothrombin Time 10.7 SEC 


 


Prothromb Time International


Ratio 1.1 RATIO 


 


 


Activated Partial


Thromboplast Time 26.6 SEC 


 


 


Blood Urea Nitrogen 12 MG/DL 


 


Creatinine 0.40 MG/DL 


 


Random Glucose 102 MG/DL 


 


Calcium Level 9.5 MG/DL 


 


Sodium Level 139 MEQ/L 


 


Potassium Level 4.9 MEQ/L 


 


Chloride Level 107 MEQ/L 


 


Carbon Dioxide Level 26.1 MEQ/L 


 


Anion Gap 6 MEQ/L 


 


Estimat Glomerular Filtration


Rate 189 ML/MIN 


 











[facility]


Medical Decision Making


Medical Screen Exam Complete:  Yes


Emergency Medical Condition:  Yes


Medical Record Reviewed:  Yes


Differential Diagnosis


PEG tube dislodgment versus healed PEG tube site versus electrolyte abnormality


Narrative Course


73-year-old female presents emergency department for evaluation after her PEG 

tube was noted to be dislodged today.  Patient appears nontoxic.  She is mildly 

tachycardic.  Basic lab work is ordered to evaluate for dehydration as well as 

coag profile.  Workup is initiated in triage ambulance hallway. 


Lab work is reviewed and without acute concern.





Laboratory Tests








Test


  3/29/18


12:50


 


White Blood Count 4.9 TH/MM3 


 


Red Blood Count 5.33 MIL/MM3 


 


Hemoglobin 11.7 GM/DL 


 


Hematocrit 37.7 % 


 


Mean Corpuscular Volume 70.8 FL 


 


Mean Corpuscular Hemoglobin 22.0 PG 


 


Mean Corpuscular Hemoglobin


Concent 31.1 % 


 


 


Red Cell Distribution Width 15.2 % 


 


Platelet Count 284 TH/MM3 


 


Mean Platelet Volume 9.9 FL 


 


Neutrophils (%) (Auto) 62.1 % 


 


Lymphocytes (%) (Auto) 24.0 % 


 


Monocytes (%) (Auto) 11.5 % 


 


Eosinophils (%) (Auto) 1.4 % 


 


Basophils (%) (Auto) 1.0 % 


 


Neutrophils # (Auto) 3.0 TH/MM3 


 


Lymphocytes # (Auto) 1.2 TH/MM3 


 


Monocytes # (Auto) 0.6 TH/MM3 


 


Eosinophils # (Auto) 0.1 TH/MM3 


 


Basophils # (Auto) 0.0 TH/MM3 


 


CBC Comment DIFF FINAL 


 


Differential Comment  


 


Prothrombin Time 10.7 SEC 


 


Prothromb Time International


Ratio 1.1 RATIO 


 


 


Activated Partial


Thromboplast Time 26.6 SEC 


 


 


Blood Urea Nitrogen 12 MG/DL 


 


Creatinine 0.40 MG/DL 


 


Random Glucose 102 MG/DL 


 


Calcium Level 9.5 MG/DL 


 


Sodium Level 139 MEQ/L 


 


Potassium Level 4.9 MEQ/L 


 


Chloride Level 107 MEQ/L 


 


Carbon Dioxide Level 26.1 MEQ/L 


 


Anion Gap 6 MEQ/L 


 


Estimat Glomerular Filtration


Rate 189 ML/MIN 


 





1440 patient returns from invasive radiology.  G-tube has been replaced.  Per 

documentation, this happened without difficulty.  Patient was observed and 

postanesthesia unit.  She is ready for discharge at this time.





Diagnosis





 Primary Impression:  


 Gastrostomy complication


Referrals:  


Primary Care Physician


Patient Instructions:  General Instructions, How to Use and Care for Your PEG 

Tube (DC)





***Additional Instructions:  


Follow-up with your primary care provider


Return immediately with any acute worsening symptoms


***Med/Other Pt SpecificInfo:  No Change to Meds


Disposition:  03 DISCHARGE TO SNF


Condition:  Stable











Gabriela Sanabria Mar 29, 2018 12:57

## 2020-08-24 NOTE — PD
Data


Data


Last Documented VS





Vital Signs








  Date Time  Temp Pulse Resp B/P (MAP) Pulse Ox O2 Delivery O2 Flow Rate FiO2


 


9/18/17 10:07  89 16 183/87 (119) 99 Room Air  


 


9/18/17 03:43 97.9       








Orders





 Orders


Complete Blood Count With Diff (9/18/17 04:23)


Basic Metabolic Panel (Bmp) (9/18/17 04:23)


Prothrombin Time / Inr (Pt) (9/18/17 04:23)


Act Partial Throm Time (Ptt) (9/18/17 04:23)


Chest, Single Ap (9/18/17 04:23)


Iv Access Insert/Monitor (9/18/17 04:23)


Ecg Monitoring (9/18/17 04:23)


Oximetry (9/18/17 04:23)


Sodium Chlor 0.9% 1000 Ml Inj (Ns 1000 M (9/18/17 04:30)


Fentanyl Inj (Fentanyl Inj) (9/18/17 10:31)


Midazolam Inj (Versed Inj) (9/18/17 10:31)


Diet Progression Instructions (9/18/17 11:15)


Wound Care (9/18/17 11:15)


Wound Care (9/18/17 11:15)


^ Flush Tube (9/18/17 11:15)


^ Crush Medications (9/18/17 11:15)


Notify Dr: Other (9/18/17 11:15)


Notify Dr: Temperature (9/18/17 11:15)


Gastrostomy Tube Placement (9/18/17 )


Iohexol 350 Inj (Omnipaque 350 Inj) (9/18/17 03:39)





Labs





Laboratory Tests








Test


  9/18/17


06:15


 


White Blood Count 4.9 TH/MM3 


 


Red Blood Count 5.30 MIL/MM3 


 


Hemoglobin 11.7 GM/DL 


 


Hematocrit 37.1 % 


 


Mean Corpuscular Volume 70.0 FL 


 


Mean Corpuscular Hemoglobin 22.0 PG 


 


Mean Corpuscular Hemoglobin


Concent 31.5 % 


 


 


Red Cell Distribution Width 17.5 % 


 


Platelet Count 178 TH/MM3 


 


Mean Platelet Volume 10.0 FL 


 


Neutrophils (%) (Auto) 49.8 % 


 


Lymphocytes (%) (Auto) 35.2 % 


 


Monocytes (%) (Auto) 12.1 % 


 


Eosinophils (%) (Auto) 1.8 % 


 


Basophils (%) (Auto) 1.1 % 


 


Neutrophils # (Auto) 2.4 TH/MM3 


 


Lymphocytes # (Auto) 1.7 TH/MM3 


 


Monocytes # (Auto) 0.6 TH/MM3 


 


Eosinophils # (Auto) 0.1 TH/MM3 


 


Basophils # (Auto) 0.1 TH/MM3 


 


CBC Comment DIFF FINAL 


 


Differential Comment  


 


Prothrombin Time 11.0 SEC 


 


Prothromb Time International


Ratio 1.0 RATIO 


 


 


Activated Partial


Thromboplast Time 25.6 SEC 


 


 


Blood Urea Nitrogen 17 MG/DL 


 


Creatinine 0.52 MG/DL 


 


Random Glucose 91 MG/DL 


 


Calcium Level 9.7 MG/DL 


 


Sodium Level 141 MEQ/L 


 


Potassium Level 4.3 MEQ/L 


 


Chloride Level 106 MEQ/L 


 


Carbon Dioxide Level 30.1 MEQ/L 


 


Anion Gap 5 MEQ/L 


 


Estimat Glomerular Filtration


Rate 140 ML/MIN 


 











MDM


Supervised Visit with MELITON:  No


Narrative Course


This is a 72-year-old female who is on hospice who presents with her G-tube 

displaced.  It was unable to replace in the emergency department.  She went to 

interventional radiology where was replaced without trouble.  Patient will be 

discharged home.


Diagnosis





 Primary Impression:  


 Dislodged gastrostomy tube


Patient Instructions:  General Instructions


***Med/Other Pt SpecificInfo:  No Change to Meds


Disposition:  01 DISCHARGE HOME


Condition:  Stable











Stefania Epps MD Sep 18, 2017 12:21 2